# Patient Record
Sex: MALE | Race: WHITE | Employment: FULL TIME | URBAN - METROPOLITAN AREA
[De-identification: names, ages, dates, MRNs, and addresses within clinical notes are randomized per-mention and may not be internally consistent; named-entity substitution may affect disease eponyms.]

---

## 2017-11-07 ENCOUNTER — OFFICE VISIT (OUTPATIENT)
Dept: INTERNAL MEDICINE CLINIC | Age: 27
End: 2017-11-07

## 2017-11-07 VITALS
DIASTOLIC BLOOD PRESSURE: 70 MMHG | HEIGHT: 73 IN | HEART RATE: 65 BPM | BODY MASS INDEX: 25.18 KG/M2 | OXYGEN SATURATION: 95 % | SYSTOLIC BLOOD PRESSURE: 106 MMHG | WEIGHT: 190 LBS | TEMPERATURE: 97 F

## 2017-11-07 DIAGNOSIS — R22.1 NECK MASS: Primary | ICD-10-CM

## 2017-11-07 NOTE — MR AVS SNAPSHOT
Visit Information Date & Time Provider Department Dept. Phone Encounter #  
 11/7/2017  1:50 PM Lupis Callahan MD [facility] [id number] Follow-up Instructions Return if symptoms worsen or fail to improve. Upcoming Health Maintenance Date Due DTaP/Tdap/Td series (1 - Tdap) 3/3/2013 Influenza Age 5 to Adult 8/1/2017 Allergies as of 11/7/2017  Review Complete On: 11/7/2017 By: Lupis Callahan MD  
  
 Severity Noted Reaction Type Reactions Banana Medium 10/28/2015    Itching Current Immunizations  Never Reviewed No immunizations on file. Not reviewed this visit You Were Diagnosed With   
  
 Codes Comments Neck mass    -  Primary ICD-10-CM: R22.1 ICD-9-CM: 666. 2 Vitals BP Pulse Temp Height(growth percentile) Weight(growth percentile) SpO2  
 106/70 (BP 1 Location: Left arm, BP Patient Position: Sitting) 65 97 °F (36.1 °C) 6' 1\" (1.854 m) 190 lb (86.2 kg) 95% BMI Smoking Status 25.07 kg/m2 Never Smoker BMI and BSA Data Body Mass Index Body Surface Area 25.07 kg/m 2 2.11 m 2 Preferred Pharmacy Pharmacy Name Phone [facility] [address] Myles Boxer [phone] Your Updated Medication List  
  
Notice  As of 11/7/2017  2:19 PM  
 You have not been prescribed any medications. We Performed the Following REFERRAL TO GENERAL SURGERY [REF27 Custom] Comments: Anterior soft tissue neck mass Follow-up Instructions Return if symptoms worsen or fail to improve. Referral Information Referral ID Referred By Referred To  
  
 [id number] Misty Gomez MD   
   [address] [place] [address] [address] [place] [address] [facility] Phone: [phone] Fax: [phone] Visits Status Start Date End Date 1 New Request 11/7/17 11/7/18 If your referral has a status of pending review or denied, additional information will be sent to support the outcome of this decision. Introducing hospitals & HEALTH SERVICES! Ron Garcia introduces [employer] patient portal. Now you can access parts of your medical record, email your doctor's office, and request medication refills online. 1. In your internet browser, go to [email or web address] 2. Click on the First Time User? Click Here link in the Sign In box. You will see the New Member Sign Up page. 3. Enter your [employer] Access Code exactly as it appears below. You will not need to use this code after youve completed the sign-up process. If you do not sign up before the expiration date, you must request a new code. · [employer] Access Code: [email or web address] Expires: 2/5/2018  1:46 PM 
 
4. Enter the last four digits of your Social Security Number (xxxx) and Date of Birth (mm/dd/yyyy) as indicated and click Submit. You will be taken to the next sign-up page. 5. Create a [employer] ID. This will be your [employer] login ID and cannot be changed, so think of one that is secure and easy to remember. 6. Create a [employer] password. You can change your password at any time. 7. Enter your Password Reset Question and Answer. This can be used at a later time if you forget your password. 8. Enter your e-mail address. You will receive e-mail notification when new information is available in [address] 9. Click Sign Up. You can now view and download portions of your medical record. 10. Click the Download Summary menu link to download a portable copy of your medical information. If you have questions, please visit the Frequently Asked Questions section of the [employer] website. Remember, [employer] is NOT to be used for urgent needs. For medical emergencies, dial 911. Now available from your iPhone and Android! Please provide this summary of care documentation to your next provider. Your primary care clinician is listed as KINGS Bee. If you have any questions after today's visit, please call [phone].

## 2017-11-07 NOTE — PROGRESS NOTES
This note will not be viewable in [address] Subjective: Bert Hood presents the office today having found a neck mass. The mass is in the anterior portion of his neck approximately over the cricoid cartilage. It is nontender and it moves whenever he swallows. He has had no dysphasia. He denies any skin changes. Past Medical History:   Diagnosis Date    Headache      Past Surgical History:   Procedure Laterality Date    HX HEENT      tonsillectomy    HX TONSILLECTOMY       Allergies   Allergen Reactions    Banana Itching       Social History     Social History    Marital status: SINGLE     Spouse name: N/A    Number of children: N/A    Years of education: N/A     Social History Main Topics    Smoking status: Never Smoker    Smokeless tobacco: Never Used    Alcohol use 0.6 oz/week     1 Cans of beer per week    Drug use: No    Sexual activity: Not Asked     Other Topics Concern    None     Social History Narrative     Family History   Problem Relation Age of Onset    No Known Problems Mother     Cancer Father      renal    Diabetes Maternal Grandmother        Review of Systems:  GEN: no weight loss, weight gain, fatigue or night sweats  ENT: soft tissue neck mass found  CV: no PND, orthopnea, or palpitations  Resp: no dyspnea on exertion, no cough  Abd: no nausea, vomiting or diarrhea  EXT: denies edema, claudication  Endocrine: no hair loss, excessive thirst or polyuria  Neurological ROS: no TIA or stroke symptoms  ROS otherwise negative      Objective:     Visit Vitals    /70 (BP 1 Location: Left arm, BP Patient Position: Sitting)    Pulse 65    Temp 97 °F (36.1 °C)    Ht 6' 1\" (1.854 m)    Wt 190 lb (86.2 kg)    SpO2 95%    BMI 25.07 kg/m2     Body mass index is 25.07 kg/(m^2). General:   alert, cooperative and no distress   Neck  there is a soft tissue neck mass present over the cricoid cartilage which is freely movable, nontender, soft and rubbery.   No neck gland adenopathy is appreciated. No overlying skin changes are noted   Mouth:  No oral lesions, no pharyngeal erythema, no exudates   Neck: Trachea midline, no thyromegaly, no bruits   Heart: S1 and S2 normal,no murmurs noted    Lungs: Clear to auscultation bilaterally, no increased work of breathing   Abdomen: Soft, nontender. Normal bowel sounds   Extremities: No edema or cyanosis   Neuro: ..alert, oriented x3,speech normal in context and clarity, cranial nerves II-XII intact,motor strength: full proximally and distally,gait: normal  reflexes: full and symmetric     Physical exam otherwise negative         Assessment/Plan:     Diagnoses and all orders for this visit:    Neck mass  -     REFERRAL TO GENERAL SURGERY        Other instructions: The patient has a discrete soft tissue mass overlying the cricoid cartilage which is freely movable soft and rubbery and may just be a lipoma. We will arrange for a surgical consultation to have this looked at and evaluated for excision. Follow-up Disposition:  Return if symptoms worsen or fail to improve.     Feng Rene MD

## 2017-11-07 NOTE — PROGRESS NOTES
Sherri Reis is a 24 y.o. male presenting for Cyst (throat)  . 1. Have you been to the ER, urgent care clinic since your last visit? Hospitalized since your last visit? No    2. Have you seen or consulted any other health care providers outside of the [address] [place] [address] since your last visit? Include any pap smears or colon screening. No    No flowsheet data found. No flowsheet data found. PHQ over the last two weeks 11/7/2017   Little interest or pleasure in doing things Not at all   Feeling down, depressed or hopeless Not at all   Total Score PHQ 2 0       There are no discontinued medications.

## 2017-11-14 ENCOUNTER — OFFICE VISIT (OUTPATIENT)
Dept: SURGERY | Age: 27
End: 2017-11-14

## 2017-11-14 VITALS
BODY MASS INDEX: 25.18 KG/M2 | SYSTOLIC BLOOD PRESSURE: 105 MMHG | DIASTOLIC BLOOD PRESSURE: 69 MMHG | OXYGEN SATURATION: 99 % | HEIGHT: 73 IN | WEIGHT: 190 LBS | HEART RATE: 68 BPM | RESPIRATION RATE: 20 BRPM

## 2017-11-14 DIAGNOSIS — R22.1 NECK MASS: Primary | ICD-10-CM

## 2017-11-14 NOTE — MR AVS SNAPSHOT
Visit Information Date & Time Provider Department Dept. Phone Encounter #  
 11/14/2017  4:00 PM Tenny Galeazzi, MD [facility] [id number] Upcoming Health Maintenance Date Due DTaP/Tdap/Td series (1 - Tdap) 3/3/2013 Influenza Age 5 to Adult 8/1/2017 Allergies as of 11/14/2017  Review Complete On: 11/7/2017 By: Feng Rene MD  
  
 Severity Noted Reaction Type Reactions Banana Medium 10/28/2015    Itching Current Immunizations  Never Reviewed No immunizations on file. Not reviewed this visit Vitals BP Pulse Resp Height(growth percentile) Weight(growth percentile) SpO2  
 105/69 68 20 6' 1\" (1.854 m) 190 lb (86.2 kg) 99% BMI Smoking Status 25.07 kg/m2 Never Smoker BMI and BSA Data Body Mass Index Body Surface Area 25.07 kg/m 2 2.11 m 2 Preferred Pharmacy Pharmacy Name Phone [address] Gallegos, [address] [place] [address] [phone] Your Updated Medication List  
  
Notice  As of 11/14/2017  4:22 PM  
 You have not been prescribed any medications. Introducing [facility]! Sharita Nickerson introduces [employer] patient portal. Now you can access parts of your medical record, email your doctor's office, and request medication refills online. 1. In your internet browser, go to [email or web address] 2. Click on the First Time User? Click Here link in the Sign In box. You will see the New Member Sign Up page. 3. Enter your [employer] Access Code exactly as it appears below. You will not need to use this code after youve completed the sign-up process. If you do not sign up before the expiration date, you must request a new code. · [employer] Access Code: [email or web address] Expires: 2/5/2018  1:46 PM 
 
4.  Enter the last four digits of your Social Security Number (xxxx) and Date of Birth (mm/dd/yyyy) as indicated and click Submit. You will be taken to the next sign-up page. 5. Create a [employer] ID. This will be your [employer] login ID and cannot be changed, so think of one that is secure and easy to remember. 6. Create a [employer] password. You can change your password at any time. 7. Enter your Password Reset Question and Answer. This can be used at a later time if you forget your password. 8. Enter your e-mail address. You will receive e-mail notification when new information is available in [address] 9. Click Sign Up. You can now view and download portions of your medical record. 10. Click the Download Summary menu link to download a portable copy of your medical information. If you have questions, please visit the Frequently Asked Questions section of the [employer] website. Remember, [employer] is NOT to be used for urgent needs. For medical emergencies, dial 911. Now available from your iPhone and Android! Please provide this summary of care documentation to your next provider. Your primary care clinician is listed as KINGS Bee. If you have any questions after today's visit, please call [phone].

## 2017-11-15 ENCOUNTER — TELEPHONE (OUTPATIENT)
Dept: SURGERY | Age: 27
End: 2017-11-15

## 2017-11-15 NOTE — TELEPHONE ENCOUNTER
Called spoke to Romana Drew @ [facility] appointment made for 11/28 with Dr. Funmilayo Sunshine. All information given to patient.

## 2017-12-06 NOTE — PROGRESS NOTES
To: Malia Stout MD, Maria L Mcgowan MD  From: Kathleen Rojo MD    Thank you for sending Alicia Austin to see us. 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  Body mass index is 25.07 kg/(m^2). Plan:   Discussed with Dr. Cora Foster. He will see patient who will likely need excision in the OR. HPI:   Alicia Austin is a 24 y.o. male who is seen in consultation at the request of Malia Stout MD for evaluation of a tender nodule over his cisneros apple. It was first noticed a few weeks ago. It has been inflamed. It has been painful. There has not been drainage. Past Medical History:   Diagnosis Date    Headache      Past Surgical History:   Procedure Laterality Date    HX HEENT      tonsillectomy    HX TONSILLECTOMY        Family History   Problem Relation Age of Onset    No Known Problems Mother     Cancer Father      renal    Diabetes Maternal Grandmother      Social History   Substance Use Topics    Smoking status: Never Smoker    Smokeless tobacco: Never Used    Alcohol use 0.6 oz/week     1 Cans of beer per week      No current outpatient prescriptions on file. No current facility-administered medications for this visit. Allergies: Allergies   Allergen Reactions    Banana Itching       Review of Systems:  10 systems reviewed. See scanned sheet in \"Media\" section. See HPI for pertinent positives and negatives. Objective:     Visit Vitals    /69    Pulse 68    Resp 20    Ht 6' 1\" (1.854 m)    Wt 86.2 kg (190 lb)    SpO2 99%    BMI 25.07 kg/m2       Physical Exam:  General appearance  Alert, cooperative, no distress, appears stated age   [de-identified] Anicteric           Lungs   Clear to auscultation bilaterally   Heart  Regular rate and rhythm. No murmur, rub or gallop   Abdomen   Soft, non-tender.     Extremities no cyanosis or edema   Pulses 2+ right radial       Lymph nodes No palpable cervical LAD. Neurologic Without overt sensory or motor deficit     Focused exam of the neck reveals midline tender nodule over thyroid that elevates with swallowing. No erythema.       Signed By: Cayla Craft MD     December 6, 2017

## 2017-12-11 ENCOUNTER — HOSPITAL ENCOUNTER (OUTPATIENT)
Dept: CT IMAGING | Age: 27
Discharge: HOME | End: 2017-12-11
Attending: OTOLARYNGOLOGY
Payer: COMMERCIAL

## 2017-12-11 DIAGNOSIS — Q89.2 THYROGLOSSAL CYST: ICD-10-CM

## 2017-12-11 PROCEDURE — 74011250636 HC RX REV CODE- 250/636: Performed by: OTOLARYNGOLOGY

## 2017-12-11 PROCEDURE — 74011636320 HC RX REV CODE- 636/320: Performed by: OTOLARYNGOLOGY

## 2017-12-11 PROCEDURE — 70491 CT SOFT TISSUE NECK W/DYE: CPT

## 2017-12-11 RX ORDER — SODIUM CHLORIDE 0.9 % (FLUSH) 0.9 %
10 SYRINGE (ML) INJECTION
Status: COMPLETED | OUTPATIENT
Start: 2017-12-11 | End: 2017-12-11

## 2017-12-11 RX ORDER — SODIUM CHLORIDE 9 MG/ML
50 INJECTION, SOLUTION INTRAVENOUS
Status: COMPLETED | OUTPATIENT
Start: 2017-12-11 | End: 2017-12-11

## 2017-12-11 RX ADMIN — Medication 10 ML: at 08:12

## 2017-12-11 RX ADMIN — IOPAMIDOL 100 ML: 612 INJECTION, SOLUTION INTRAVENOUS at 08:12

## 2017-12-11 RX ADMIN — SODIUM CHLORIDE 50 ML/HR: 900 INJECTION, SOLUTION INTRAVENOUS at 08:12

## 2017-12-13 ENCOUNTER — APPOINTMENT (OUTPATIENT)
Dept: CT IMAGING | Age: 27
End: 2017-12-13
Attending: EMERGENCY MEDICINE
Payer: COMMERCIAL

## 2017-12-13 ENCOUNTER — HOSPITAL ENCOUNTER (EMERGENCY)
Age: 27
Discharge: HOME | End: 2017-12-13
Attending: EMERGENCY MEDICINE
Payer: COMMERCIAL

## 2017-12-13 VITALS
HEART RATE: 75 BPM | RESPIRATION RATE: 16 BRPM | SYSTOLIC BLOOD PRESSURE: 148 MMHG | WEIGHT: 189.6 LBS | DIASTOLIC BLOOD PRESSURE: 91 MMHG | OXYGEN SATURATION: 99 % | HEIGHT: 73 IN | TEMPERATURE: 97.5 F | BODY MASS INDEX: 25.13 KG/M2

## 2017-12-13 DIAGNOSIS — S09.90XA CLOSED HEAD INJURY, INITIAL ENCOUNTER: Primary | ICD-10-CM

## 2017-12-13 PROCEDURE — 70450 CT HEAD/BRAIN W/O DYE: CPT

## 2017-12-13 PROCEDURE — 99282 EMERGENCY DEPT VISIT SF MDM: CPT

## 2017-12-13 PROCEDURE — 74011250637 HC RX REV CODE- 250/637: Performed by: EMERGENCY MEDICINE

## 2017-12-13 RX ORDER — ONDANSETRON 8 MG/1
8 TABLET, ORALLY DISINTEGRATING ORAL
Qty: 12 TAB | Refills: 0 | Status: SHIPPED | OUTPATIENT
Start: 2017-12-13 | End: 2017-12-19 | Stop reason: ALTCHOICE

## 2017-12-13 RX ORDER — IBUPROFEN 600 MG/1
600 TABLET ORAL
Status: COMPLETED | OUTPATIENT
Start: 2017-12-13 | End: 2017-12-13

## 2017-12-13 RX ORDER — ACETAMINOPHEN 500 MG
1000 TABLET ORAL ONCE
Status: COMPLETED | OUTPATIENT
Start: 2017-12-13 | End: 2017-12-13

## 2017-12-13 RX ADMIN — ACETAMINOPHEN 1000 MG: 500 TABLET ORAL at 19:03

## 2017-12-13 RX ADMIN — IBUPROFEN 600 MG: 600 TABLET, FILM COATED ORAL at 19:03
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
## 2017-12-13 NOTE — LETTER
[address] 
[facility] EMERGENCY DEPT 
[address] [place] [address] [phone] 
[phone] Work/School Note Date: 12/13/2017 To Whom It May concern: 
 
Ran Bosch was seen and treated today in the emergency room by the following provider(s): 
Attending Provider: Charles Keane DO. Ran Bosch may return to work on 12/15/17. Sincerely, Charles Keane DO

## 2017-12-14 NOTE — DISCHARGE INSTRUCTIONS
Learning About a Closed Head Injury  What is a closed head injury? A closed head injury happens when your head gets hit hard. The strong force of the blow causes your brain to shake in your skull. This movement can cause the brain to bruise, swell, or tear. Sometimes nerves or blood vessels also get damaged. This can cause bleeding in or around the brain. A concussion is a type of closed head injury. What are the symptoms? If you have a mild concussion, you may have a mild headache or feel \"not quite right. \" These symptoms are common. They usually go away over a few days to 4 weeks. But sometimes after a concussion, you feel like you can't function as well as before the injury. And you have new symptoms. This is called postconcussive syndrome. You may:  · Find it harder to solve problems, think, concentrate, or remember. · Have headaches. · Have changes in your sleep patterns, such as not being able to sleep or sleeping all the time. · Have changes in your personality. · Not be interested in your usual activities. · Feel angry or anxious without a clear reason. · Lose your sense of taste or smell. · Be dizzy, lightheaded, or unsteady. It may be hard to stand or walk. How is a closed head injury treated? Any person who may have a concussion needs to see a doctor. Some people have to stay in the hospital to be watched. Others can go home safely. If you go home, follow your doctor's instructions. He or she will tell you if you need someone to watch you closely for the next 24 hours or longer. Rest is the best treatment. Get plenty of sleep at night. And try to rest during the day. · Avoid activities that are physically or mentally demanding. These include housework, exercise, and schoolwork. And don't play video games, send text messages, or use the computer. You may need to change your school or work schedule to be able to avoid these activities.   · Ask your doctor when it's okay to drive, ride a bike, or operate machinery. · Take an over-the-counter pain medicine, such as acetaminophen (Tylenol), ibuprofen (Advil, Motrin), or naproxen (Aleve). Be safe with medicines. Read and follow all instructions on the label. · Check with your doctor before you use any other medicines for pain. · Do not drink alcohol or use illegal drugs. They can slow recovery. They can also increase your risk of getting a second head injury. Follow-up care is a key part of your treatment and safety. Be sure to make and go to all appointments, and call your doctor if you are having problems. It's also a good idea to know your test results and keep a list of the medicines you take. Where can you learn more? Go to http://vanda-aleena.info/. Enter E235 in the search box to learn more about \"Learning About a Closed Head Injury. \"  Current as of: October 14, 2016  Content Version: 11.4  © 9158-5694 Healthwise, [employer]orporated. Care instructions adapted under license by [employer] (which disclaims liability or warranty for this information). If you have questions about a medical condition or this instruction, always ask your healthcare professional. [address] any warranty or liability for your use of this information.

## 2017-12-14 NOTE — ED NOTES
Bedside and Verbal shift change report given to Anna Lucero RN (oncoming nurse) by Martínez Zamora RN (offgoing nurse). Report included the following information SBAR, Kardex, ED Summary, MAR and Med Rec Status.

## 2017-12-19 ENCOUNTER — OFFICE VISIT (OUTPATIENT)
Dept: INTERNAL MEDICINE CLINIC | Age: 27
End: 2017-12-19

## 2017-12-19 VITALS
TEMPERATURE: 97.1 F | DIASTOLIC BLOOD PRESSURE: 78 MMHG | HEART RATE: 80 BPM | SYSTOLIC BLOOD PRESSURE: 122 MMHG | WEIGHT: 190 LBS | HEIGHT: 73 IN | RESPIRATION RATE: 16 BRPM | BODY MASS INDEX: 25.18 KG/M2 | OXYGEN SATURATION: 98 %
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
## 2017-12-19 DIAGNOSIS — S23.9XXS THORACIC BACK SPRAIN, SEQUELA: Primary | ICD-10-CM

## 2017-12-19 RX ORDER — IBUPROFEN 200 MG
200 CAPSULE ORAL
COMMUNITY
End: 2018-05-02
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
## 2018-01-31 NOTE — PERIOP NOTES
[facility]  Ambulatory Surgery Unit  Pre-operative Instructions    Surgery/Procedure Date  2/7/18            Tentative Arrival Time TBA      1. On the day of your surgery/procedure, please report to the Ambulatory Surgery Unit Registration Desk and sign in at your designated time. The Ambulatory Surgery Unit is located in [facility] on the [facility] side of the [facility] across from the [address] [place] [address]. Please have all of your health insurance cards and a photo ID. 2. You must have someone with you to drive you home, as you should not drive a car for 24 hours following anesthesia. Please make arrangements for a responsible adult friend or family member to stay with you for at least the first 24 hours after your surgery. 3. Do not have anything to eat or drink (including water, gum, mints, coffee, juice) after midnight   2/6/18. This may not apply to medications prescribed by your physician. (Please note below the special instructions with medications to take the morning of surgery, if applicable.)    4. We recommend you do not drink any alcoholic beverages for 24 hours before and after your surgery. 5. Contact your surgeons office for instructions on the following medications: non-steroidal anti-inflammatory drugs (i.e. Advil, Aleve), vitamins, and supplements. (Some surgeons will want you to stop these medications prior to surgery and others may allow you to take them)   **If you are currently taking Plavix, Coumadin, Aspirin and/or other blood-thinning agents, contact your surgeon for instructions. ** Your surgeon will partner with the physician prescribing these medications to determine if it is safe to stop or if you need to continue taking. Please do not stop taking these medications without instructions from your surgeon.     6. In an effort to help prevent surgical site infection, we ask that you shower with an anti-bacterial soap (i.e. Dial or Safeguard) for 3 days prior to and on the morning of surgery, using a fresh towel after each shower. (Please begin this process with fresh bed linens.) Do not apply any lotions, powders, or deodorants after the shower on the day of your procedure. If applicable, please do not shave the operative site for 48 hours prior to surgery. 7. Wear comfortable clothes. Wear glasses instead of contacts. Do not bring any jewelry or money (other than copays or fees as instructed). Do not wear make-up, particularly mascara, the morning of your surgery. Do not wear nail polish, particularly if you are having foot /hand surgery. Wear your hair loose or down, no ponytails, buns, ramona pins or clips. All body piercings must be removed. 8. You should understand that if you do not follow these instructions your surgery may be cancelled. If your physical condition changes (i.e. fever, cold or flu) please contact your surgeon as soon as possible. 9. It is important that you be on time. If a situation occurs where you may be late, or if you have any questions or problems, please call [phone].    10. Your surgery time may be subject to change. You will receive a phone call the day prior to surgery to confirm your arrival time. 11. Pediatric patients: please bring a change of clothes, diapers, bottle/sippy cup, pacifier, etc.      Special Instructions: Take all medications and inhalers, as prescribed, on the morning of surgery with a sip of water EXCEPT: none      I understand a pre-operative phone call will be made to verify my surgery time. In the event that I am not available, I give permission for a message to be left on my answering service and/or with another person?       Yes     (instructions given verbally during phone assessment- pt voiced understanding)     ___________________      ___________________      ________________  (Signature of Patient)          (Witness)                   (Date and Time)

## 2018-02-06 ENCOUNTER — ANESTHESIA EVENT (OUTPATIENT)
Dept: SURGERY | Age: 28
End: 2018-02-06
Payer: COMMERCIAL

## 2018-02-07 ENCOUNTER — HOSPITAL ENCOUNTER (OUTPATIENT)
Age: 28
Setting detail: OUTPATIENT SURGERY
Discharge: HOME | End: 2018-02-07
Attending: OTOLARYNGOLOGY | Admitting: OTOLARYNGOLOGY
Payer: COMMERCIAL

## 2018-02-07 ENCOUNTER — ANESTHESIA (OUTPATIENT)
Dept: SURGERY | Age: 28
End: 2018-02-07
Payer: COMMERCIAL

## 2018-02-07 VITALS
OXYGEN SATURATION: 96 % | SYSTOLIC BLOOD PRESSURE: 106 MMHG | WEIGHT: 187.38 LBS | TEMPERATURE: 97.9 F | HEIGHT: 73 IN | HEART RATE: 51 BPM | BODY MASS INDEX: 24.83 KG/M2 | RESPIRATION RATE: 10 BRPM | DIASTOLIC BLOOD PRESSURE: 53 MMHG

## 2018-02-07 DIAGNOSIS — Q89.2 THYROGLOSSAL DUCT CYST: Primary | ICD-10-CM

## 2018-02-07 PROCEDURE — 77030026438 HC STYL ET INTUB CARD -A: Performed by: NURSE ANESTHETIST, CERTIFIED REGISTERED

## 2018-02-07 PROCEDURE — 77030031139 HC SUT VCRL2 J&J -A: Performed by: OTOLARYNGOLOGY

## 2018-02-07 PROCEDURE — 74011250636 HC RX REV CODE- 250/636

## 2018-02-07 PROCEDURE — 74011000250 HC RX REV CODE- 250: Performed by: OTOLARYNGOLOGY

## 2018-02-07 PROCEDURE — 74011250636 HC RX REV CODE- 250/636: Performed by: ANESTHESIOLOGY

## 2018-02-07 PROCEDURE — 76210000034 HC AMBSU PH I REC 0.5 TO 1 HR: Performed by: OTOLARYNGOLOGY

## 2018-02-07 PROCEDURE — 77030013079 HC BLNKT BAIR HGGR 3M -A: Performed by: NURSE ANESTHETIST, CERTIFIED REGISTERED

## 2018-02-07 PROCEDURE — 74011000250 HC RX REV CODE- 250

## 2018-02-07 PROCEDURE — 77030002933 HC SUT MCRYL J&J -A: Performed by: OTOLARYNGOLOGY

## 2018-02-07 PROCEDURE — 76210000050 HC AMBSU PH II REC 0.5 TO 1 HR: Performed by: OTOLARYNGOLOGY

## 2018-02-07 PROCEDURE — 77030021352 HC CBL LD SYS DISP COVD -B: Performed by: OTOLARYNGOLOGY

## 2018-02-07 PROCEDURE — 77030011640 HC PAD GRND REM COVD -A: Performed by: OTOLARYNGOLOGY

## 2018-02-07 PROCEDURE — 88305 TISSUE EXAM BY PATHOLOGIST: CPT | Performed by: OTOLARYNGOLOGY

## 2018-02-07 PROCEDURE — 77030013567 HC DRN WND RESERV BARD -A: Performed by: OTOLARYNGOLOGY

## 2018-02-07 PROCEDURE — 77030010938 HC CLP LIG TELE -A: Performed by: OTOLARYNGOLOGY

## 2018-02-07 PROCEDURE — 77030020255 HC SOL INJ LR 1000ML BG: Performed by: OTOLARYNGOLOGY

## 2018-02-07 PROCEDURE — 77030018836 HC SOL IRR NACL ICUM -A: Performed by: OTOLARYNGOLOGY

## 2018-02-07 PROCEDURE — 77030012407 HC DRN WND BARD -B: Performed by: OTOLARYNGOLOGY

## 2018-02-07 PROCEDURE — 77030002996 HC SUT SLK J&J -A: Performed by: OTOLARYNGOLOGY

## 2018-02-07 PROCEDURE — 77030011267 HC ELECTRD BLD COVD -A: Performed by: OTOLARYNGOLOGY

## 2018-02-07 PROCEDURE — 77030010516 HC APPL HEMA CLP TELE -B: Performed by: OTOLARYNGOLOGY

## 2018-02-07 PROCEDURE — 77030027744 HC PWDR HEMSTAT ARISTA ABSRB 5GM BARD -D: Performed by: OTOLARYNGOLOGY

## 2018-02-07 PROCEDURE — 76030000003 HC AMB SURG OR TIME 1.5 TO 2: Performed by: OTOLARYNGOLOGY

## 2018-02-07 PROCEDURE — 77030002916 HC SUT ETHLN J&J -A: Performed by: OTOLARYNGOLOGY

## 2018-02-07 PROCEDURE — 77030008684 HC TU ET CUF COVD -B: Performed by: NURSE ANESTHETIST, CERTIFIED REGISTERED

## 2018-02-07 PROCEDURE — 77030008467 HC STPLR SKN COVD -B: Performed by: OTOLARYNGOLOGY

## 2018-02-07 PROCEDURE — 76060000063 HC AMB SURG ANES 1.5 TO 2 HR: Performed by: OTOLARYNGOLOGY

## 2018-02-07 PROCEDURE — 74011250636 HC RX REV CODE- 250/636: Performed by: OTOLARYNGOLOGY

## 2018-02-07 RX ORDER — CEPHALEXIN 500 MG/1
500 CAPSULE ORAL 4 TIMES DAILY
Qty: 12 CAP | Refills: 0 | Status: SHIPPED | OUTPATIENT
Start: 2018-02-07 | End: 2018-02-10

## 2018-02-07 RX ORDER — LIDOCAINE HYDROCHLORIDE 10 MG/ML
0.1 INJECTION, SOLUTION EPIDURAL; INFILTRATION; INTRACAUDAL; PERINEURAL AS NEEDED
Status: DISCONTINUED | OUTPATIENT
Start: 2018-02-07 | End: 2018-02-07 | Stop reason: HOSPADM

## 2018-02-07 RX ORDER — SODIUM CHLORIDE, SODIUM LACTATE, POTASSIUM CHLORIDE, CALCIUM CHLORIDE 600; 310; 30; 20 MG/100ML; MG/100ML; MG/100ML; MG/100ML
25 INJECTION, SOLUTION INTRAVENOUS CONTINUOUS
Status: DISCONTINUED | OUTPATIENT
Start: 2018-02-07 | End: 2018-02-07 | Stop reason: HOSPADM

## 2018-02-07 RX ORDER — ROCURONIUM BROMIDE 10 MG/ML
INJECTION, SOLUTION INTRAVENOUS AS NEEDED
Status: DISCONTINUED | OUTPATIENT
Start: 2018-02-07 | End: 2018-02-07 | Stop reason: HOSPADM

## 2018-02-07 RX ORDER — ONDANSETRON 2 MG/ML
4 INJECTION INTRAMUSCULAR; INTRAVENOUS
Status: DISCONTINUED | OUTPATIENT
Start: 2018-02-07 | End: 2018-02-07 | Stop reason: HOSPADM

## 2018-02-07 RX ORDER — DIPHENHYDRAMINE HYDROCHLORIDE 50 MG/ML
12.5 INJECTION, SOLUTION INTRAMUSCULAR; INTRAVENOUS AS NEEDED
Status: DISCONTINUED | OUTPATIENT
Start: 2018-02-07 | End: 2018-02-07 | Stop reason: HOSPADM

## 2018-02-07 RX ORDER — LIDOCAINE HYDROCHLORIDE AND EPINEPHRINE 10; 10 MG/ML; UG/ML
1.5 INJECTION, SOLUTION INFILTRATION; PERINEURAL ONCE
Status: DISCONTINUED | OUTPATIENT
Start: 2018-02-07 | End: 2018-02-07 | Stop reason: HOSPADM

## 2018-02-07 RX ORDER — SODIUM CHLORIDE 0.9 % (FLUSH) 0.9 %
5-10 SYRINGE (ML) INJECTION AS NEEDED
Status: DISCONTINUED | OUTPATIENT
Start: 2018-02-07 | End: 2018-02-07 | Stop reason: HOSPADM

## 2018-02-07 RX ORDER — EPHEDRINE SULFATE 50 MG/ML
INJECTION, SOLUTION INTRAVENOUS AS NEEDED
Status: DISCONTINUED | OUTPATIENT
Start: 2018-02-07 | End: 2018-02-07 | Stop reason: HOSPADM

## 2018-02-07 RX ORDER — HYDROMORPHONE HYDROCHLORIDE 2 MG/ML
INJECTION, SOLUTION INTRAMUSCULAR; INTRAVENOUS; SUBCUTANEOUS AS NEEDED
Status: DISCONTINUED | OUTPATIENT
Start: 2018-02-07 | End: 2018-02-07 | Stop reason: HOSPADM

## 2018-02-07 RX ORDER — CEFAZOLIN SODIUM/WATER 2 G/20 ML
2 SYRINGE (ML) INTRAVENOUS EVERY 8 HOURS
Status: COMPLETED | OUTPATIENT
Start: 2018-02-07 | End: 2018-02-07

## 2018-02-07 RX ORDER — HYDROCODONE BITARTRATE AND ACETAMINOPHEN 5; 325 MG/1; MG/1
1-2 TABLET ORAL
Qty: 40 TAB | Refills: 0 | Status: SHIPPED | OUTPATIENT
Start: 2018-02-07 | End: 2018-05-02

## 2018-02-07 RX ORDER — BACITRACIN ZINC 500 UNIT/G
OINTMENT (GRAM) TOPICAL AS NEEDED
Status: DISCONTINUED | OUTPATIENT
Start: 2018-02-07 | End: 2018-02-07 | Stop reason: HOSPADM

## 2018-02-07 RX ORDER — CEFAZOLIN SODIUM/WATER 2 G/20 ML
SYRINGE (ML) INTRAVENOUS
Status: COMPLETED
Start: 2018-02-07 | End: 2018-02-07

## 2018-02-07 RX ORDER — OXYCODONE AND ACETAMINOPHEN 5; 325 MG/1; MG/1
1 TABLET ORAL
Status: DISCONTINUED | OUTPATIENT
Start: 2018-02-07 | End: 2018-02-07 | Stop reason: HOSPADM

## 2018-02-07 RX ORDER — FENTANYL CITRATE 50 UG/ML
INJECTION, SOLUTION INTRAMUSCULAR; INTRAVENOUS AS NEEDED
Status: DISCONTINUED | OUTPATIENT
Start: 2018-02-07 | End: 2018-02-07 | Stop reason: HOSPADM

## 2018-02-07 RX ORDER — ACETAMINOPHEN 10 MG/ML
INJECTION, SOLUTION INTRAVENOUS AS NEEDED
Status: DISCONTINUED | OUTPATIENT
Start: 2018-02-07 | End: 2018-02-07 | Stop reason: HOSPADM

## 2018-02-07 RX ORDER — ONDANSETRON 4 MG/1
4 TABLET, ORALLY DISINTEGRATING ORAL
Qty: 10 TAB | Refills: 2 | Status: SHIPPED | OUTPATIENT
Start: 2018-02-07 | End: 2018-05-02

## 2018-02-07 RX ORDER — NEOSTIGMINE METHYLSULFATE 1 MG/ML
INJECTION INTRAVENOUS AS NEEDED
Status: DISCONTINUED | OUTPATIENT
Start: 2018-02-07 | End: 2018-02-07 | Stop reason: HOSPADM

## 2018-02-07 RX ORDER — SUCCINYLCHOLINE CHLORIDE 20 MG/ML
INJECTION INTRAMUSCULAR; INTRAVENOUS AS NEEDED
Status: DISCONTINUED | OUTPATIENT
Start: 2018-02-07 | End: 2018-02-07 | Stop reason: HOSPADM

## 2018-02-07 RX ORDER — DIPHENHYDRAMINE HYDROCHLORIDE 50 MG/ML
25 INJECTION, SOLUTION INTRAMUSCULAR; INTRAVENOUS
Status: DISCONTINUED | OUTPATIENT
Start: 2018-02-07 | End: 2018-02-07 | Stop reason: HOSPADM

## 2018-02-07 RX ORDER — LIDOCAINE HYDROCHLORIDE 20 MG/ML
INJECTION, SOLUTION EPIDURAL; INFILTRATION; INTRACAUDAL; PERINEURAL AS NEEDED
Status: DISCONTINUED | OUTPATIENT
Start: 2018-02-07 | End: 2018-02-07 | Stop reason: HOSPADM

## 2018-02-07 RX ORDER — FENTANYL CITRATE 50 UG/ML
25 INJECTION, SOLUTION INTRAMUSCULAR; INTRAVENOUS
Status: DISCONTINUED | OUTPATIENT
Start: 2018-02-07 | End: 2018-02-07 | Stop reason: HOSPADM

## 2018-02-07 RX ORDER — MIDAZOLAM HYDROCHLORIDE 1 MG/ML
INJECTION, SOLUTION INTRAMUSCULAR; INTRAVENOUS AS NEEDED
Status: DISCONTINUED | OUTPATIENT
Start: 2018-02-07 | End: 2018-02-07 | Stop reason: HOSPADM

## 2018-02-07 RX ORDER — GLYCOPYRROLATE 0.2 MG/ML
INJECTION INTRAMUSCULAR; INTRAVENOUS AS NEEDED
Status: DISCONTINUED | OUTPATIENT
Start: 2018-02-07 | End: 2018-02-07 | Stop reason: HOSPADM

## 2018-02-07 RX ORDER — MORPHINE SULFATE 10 MG/ML
2 INJECTION, SOLUTION INTRAMUSCULAR; INTRAVENOUS
Status: DISCONTINUED | OUTPATIENT
Start: 2018-02-07 | End: 2018-02-07 | Stop reason: HOSPADM

## 2018-02-07 RX ORDER — HYDROCODONE BITARTRATE AND ACETAMINOPHEN 7.5; 325 MG/1; MG/1
1-2 TABLET ORAL
Status: DISCONTINUED | OUTPATIENT
Start: 2018-02-07 | End: 2018-02-07 | Stop reason: HOSPADM

## 2018-02-07 RX ORDER — SODIUM CHLORIDE 0.9 % (FLUSH) 0.9 %
5-10 SYRINGE (ML) INJECTION EVERY 8 HOURS
Status: DISCONTINUED | OUTPATIENT
Start: 2018-02-07 | End: 2018-02-07 | Stop reason: HOSPADM

## 2018-02-07 RX ORDER — DEXAMETHASONE SODIUM PHOSPHATE 4 MG/ML
INJECTION, SOLUTION INTRA-ARTICULAR; INTRALESIONAL; INTRAMUSCULAR; INTRAVENOUS; SOFT TISSUE AS NEEDED
Status: DISCONTINUED | OUTPATIENT
Start: 2018-02-07 | End: 2018-02-07 | Stop reason: HOSPADM

## 2018-02-07 RX ORDER — PROPOFOL 10 MG/ML
INJECTION, EMULSION INTRAVENOUS AS NEEDED
Status: DISCONTINUED | OUTPATIENT
Start: 2018-02-07 | End: 2018-02-07 | Stop reason: HOSPADM

## 2018-02-07 RX ORDER — HYDROMORPHONE HYDROCHLORIDE 1 MG/ML
.2-.5 INJECTION, SOLUTION INTRAMUSCULAR; INTRAVENOUS; SUBCUTANEOUS ONCE
Status: DISCONTINUED | OUTPATIENT
Start: 2018-02-07 | End: 2018-02-07 | Stop reason: HOSPADM

## 2018-02-07 RX ORDER — ONDANSETRON 2 MG/ML
INJECTION INTRAMUSCULAR; INTRAVENOUS AS NEEDED
Status: DISCONTINUED | OUTPATIENT
Start: 2018-02-07 | End: 2018-02-07 | Stop reason: HOSPADM

## 2018-02-07 RX ADMIN — PROPOFOL 180 MG: 10 INJECTION, EMULSION INTRAVENOUS at 09:08

## 2018-02-07 RX ADMIN — DEXAMETHASONE SODIUM PHOSPHATE 10 MG: 4 INJECTION, SOLUTION INTRA-ARTICULAR; INTRALESIONAL; INTRAMUSCULAR; INTRAVENOUS; SOFT TISSUE at 09:23

## 2018-02-07 RX ADMIN — EPHEDRINE SULFATE 10 MG: 50 INJECTION, SOLUTION INTRAVENOUS at 09:38

## 2018-02-07 RX ADMIN — Medication 2 G: at 09:19

## 2018-02-07 RX ADMIN — ONDANSETRON 4 MG: 2 INJECTION INTRAMUSCULAR; INTRAVENOUS at 10:57

## 2018-02-07 RX ADMIN — ACETAMINOPHEN 1000 MG: 10 INJECTION, SOLUTION INTRAVENOUS at 10:44

## 2018-02-07 RX ADMIN — SODIUM CHLORIDE, SODIUM LACTATE, POTASSIUM CHLORIDE, AND CALCIUM CHLORIDE 25 ML/HR: 600; 310; 30; 20 INJECTION, SOLUTION INTRAVENOUS at 08:09

## 2018-02-07 RX ADMIN — NEOSTIGMINE METHYLSULFATE 3 MG: 1 INJECTION INTRAVENOUS at 10:55

## 2018-02-07 RX ADMIN — ROCURONIUM BROMIDE 30 MG: 10 INJECTION, SOLUTION INTRAVENOUS at 09:15

## 2018-02-07 RX ADMIN — FENTANYL CITRATE 50 MCG: 50 INJECTION, SOLUTION INTRAMUSCULAR; INTRAVENOUS at 09:16

## 2018-02-07 RX ADMIN — SUCCINYLCHOLINE CHLORIDE 180 MG: 20 INJECTION INTRAMUSCULAR; INTRAVENOUS at 09:08

## 2018-02-07 RX ADMIN — FENTANYL CITRATE 50 MCG: 50 INJECTION, SOLUTION INTRAMUSCULAR; INTRAVENOUS at 09:08

## 2018-02-07 RX ADMIN — GLYCOPYRROLATE 0.5 MG: 0.2 INJECTION INTRAMUSCULAR; INTRAVENOUS at 10:54

## 2018-02-07 RX ADMIN — MIDAZOLAM HYDROCHLORIDE 2 MG: 1 INJECTION, SOLUTION INTRAMUSCULAR; INTRAVENOUS at 09:03

## 2018-02-07 RX ADMIN — HYDROMORPHONE HYDROCHLORIDE 0.4 MG: 2 INJECTION, SOLUTION INTRAMUSCULAR; INTRAVENOUS; SUBCUTANEOUS at 10:59

## 2018-02-07 RX ADMIN — LIDOCAINE HYDROCHLORIDE 80 MG: 20 INJECTION, SOLUTION EPIDURAL; INFILTRATION; INTRACAUDAL; PERINEURAL at 09:08

## 2018-02-07 RX ADMIN — SODIUM CHLORIDE, SODIUM LACTATE, POTASSIUM CHLORIDE, AND CALCIUM CHLORIDE: 600; 310; 30; 20 INJECTION, SOLUTION INTRAVENOUS at 10:57

## 2018-02-07 NOTE — IP AVS SNAPSHOT
[address] [facility] 
[phone] Patient: George Joy MRN: [id number] UOR:[birth year] About your hospitalization You were admitted on:  February 7, 2018 You last received care in the:  [facility] ASU PACU You were discharged on:  February 7, 2018 Why you were hospitalized Your primary diagnosis was:  Not on File Follow-up Information Follow up With Details Comments Contact Info MD Peggy Lentz [address] [facility] 
[phone] Discharge Orders None A check sergio indicates which time of day the medication should be taken. My Medications START taking these medications Instructions Each Dose to Equal  
 Morning Noon Evening Bedtime  
 cephALEXin 500 mg capsule Commonly known as:  Jazmin Rush Your last dose was: Your next dose is: Take 1 Cap by mouth four (4) times daily for 3 days. 500 mg HYDROcodone-acetaminophen 5-325 mg per tablet Commonly known as:  Renetta Fling Your last dose was: Your next dose is: Take 1-2 Tabs by mouth every six (6) hours as needed for Pain. Max Daily Amount: 8 Tabs. 1-2 Tab  
    
   
   
   
  
 ondansetron 4 mg disintegrating tablet Commonly known as:  ZOFRAN ODT Your last dose was: Your next dose is: Take 1 Tab by mouth every eight (8) hours as needed for Nausea. 4 mg CONTINUE taking these medications Instructions Each Dose to Equal  
 Morning Noon Evening Bedtime  
 ibuprofen 200 mg Cap Your last dose was: Your next dose is: Take 200 mg by mouth daily as needed. 200 mg Where to Get Your Medications Information on where to get these meds will be given to you by the nurse or doctor. ! Ask your nurse or doctor about these medications  
  cephALEXin 500 mg capsule HYDROcodone-acetaminophen 5-325 mg per tablet  
 ondansetron 4 mg disintegrating tablet Discharge Instructions [address], [address] [facility] Operative Instructions 1. DIET Start a soft diet and progress to usual diet as tolerated, unless otherwise directed. It is important to remember that good overall diet and health promotes healing. 2.  ACTIVITY No heavy exertion or heavy lifting for 10 days. Light activities are permitted but, avoid any stretching or bending of the neck for 10 days. 3.  WOUND CARE A. Apply antibiotic ointment twice daily to suture or staple sites and continue for 2-3 days following removal. 
B. May shower or bathe following surgery but make sure to apply antibiotic ointment prior to doing so and do not submerge the area in water. C. If you have a paper tape dressing, make every attempt to keep it dry until it is removed. 4.  THINGS TO BE CONCERNED ABOUT Please call the office for any of these changes A. Increasing pain B. New red discoloration C. New drainage of any description D. Increasing warmth of wound 5. DRAIN MANAGEMENT A. Follow nursing instructions B. Generally, your doctor will plan to have drains removed between 1 and 3 days. 6. LONG-TERM WOUND HEALING 
A. Expect the wound to have a slight ridge for up to 6 weeks. This is usually by design for optimal wound healing. B. Local numbness is usual around wound sites, and can last up to and beyond 3 months. C. To minimize the prolonged redness or pigmentation around a wound, we recommend use of sunscreen for 3 months or longer on healed wounds. If you have any questions or concerns following your surgery, do not hesitate to contact our office at Office Phone:  [phone] Jason Fuentes office hours are 8:00 a.m. to 4:30 p.m.  You should be able to reach us after hours by calling the regular office number. If for some reason you are not able to reach our [address] [place] [address] service through this main number you may call them directly at [phone]. DO NOT TAKE TYLENOL/ACETAMINOPHEN WITH PERCOCET, LORTAB, [address] TAKE NARCOTIC PAIN MEDICATIONS WITH FOOD If given 2 pain narcotics do NOT take together! Narcotics tend to be constipating, we suggest taking a stool softener such as Colace or Miralax (follow package instructions). DO NOT DRIVE WHILE TAKING NARCOTIC PAIN MEDICATIONS. DO NOT TAKE SLEEPING MEDICATIONS OR ANTIANXIETY MEDICATIONS WHILE TAKING NARCOTIC PAIN MEDICATIONS,  ESPECIALLY THE NIGHT OF ANESTHESIA. CPAP PATIENTS BE SURE TO WEAR MACHINE WHENEVER NAPPING OR SLEEPING. DISCHARGE SUMMARY from Nurse The following personal items collected during your admission are returned to you:  
Dental Appliance: Dental Appliances: None Vision: Visual Aid: None Hearing Aid:   
Jewelry: Jewelry: None Clothing: Clothing: Other (comment) (belonging bag) Other Valuables: Other Valuables: Cell Phone (given to mom) Valuables sent to safe:   
 
 
PATIENT INSTRUCTIONS: 
 
 
B - Balance E - Eyes F-  Face looks uneven A-  Arms unable to move or move even S-  Speech slurred or non-existent T-  Time-call 911 as soon as signs and symptoms begin-DO NOT go Back to bed or wait to see if you get better-TIME IS BRAIN. If you have not received your influenza and/or pneumococcal vaccine, please follow up with your primary care physician. The discharge information has been reviewed with the patient and caregiver. The patient and caregiver verbalized understanding. Introducing [facility]! [employer] introduces [employer] patient portal. Now you can access parts of your medical record, email your doctor's office, and request medication refills online. 1. In your internet browser, go to [email or web address] [email or web address] 2. Click on the First Time User? Click Here link in the Sign In box. You will see the New Member Sign Up page. 3. Enter your [employer] Access Code exactly as it appears below. You will not need to use this code after youve completed the sign-up process. If you do not sign up before the expiration date, you must request a new code. · [employer] Access Code: [email or web address] Expires: 5/7/2018  6:20 AM 
 
 4. Enter the last four digits of your Social Security Number (xxxx) and Date of Birth (mm/dd/yyyy) as indicated and click Submit. You will be taken to the next sign-up page. 5. Create a [employer] ID. This will be your [employer] login ID and cannot be changed, so think of one that is secure and easy to remember. 6. Create a [employer] password. You can change your password at any time. 7. Enter your Password Reset Question and Answer. This can be used at a later time if you forget your password. 8. Enter your e-mail address. You will receive e-mail notification when new information is available in [address] 9. Click Sign Up. You can now view and download portions of your medical record. 10. Click the Download Summary menu link to download a portable copy of your medical information. If you have questions, please visit the Frequently Asked Questions section of the [employer] website. Remember, [employer] is NOT to be used for urgent needs. For medical emergencies, dial 911. Now available from your iPhone and Android! Providers Seen During Your Hospitalization Provider Specialty Primary office phone Reese Gary MD Otolaryngology [phone] Your Primary Care Physician (PCP) Primary Care Physician Office Phone Office Fax Cat Parker [phone] You are allergic to the following Allergen Reactions Banana Itching Recent Documentation Height Weight BMI Smoking Status 1.854 m 85 kg 24.72 kg/m2 Never Smoker Emergency Contacts Name Discharge Info Relation Home Work Mobile [facility] DISCHARGE CAREGIVER [3] Mother [14] [phone] Patient Belongings  The following personal items are in your possession at time of discharge: 
  Dental Appliances: None  Visual Aid: None      Home Medications: None   Jewelry: None  Clothing: Other (comment) (belonging bag)    Other Valuables: Cell Phone (given to mom) Discharge Instructions Attachments/References SURGICAL DRAIN CARE (ENGLISH) MEFS - HYDROCODONE/ACETAMINOPHEN (VICODIN, Heidy Raúl, LORTAB) - (BY MOUTH) (ENGLISH) MEFS - ONDANSETRON (ZOFRAN, ZOFRAN ODT, ZUPLENZ) - (BY MOUTH, INTO THE MOUTH) (ENGLISH) MEFS - CEPHALEXIN (BIO-CEF, KEFLEX) - (BY MOUTH) (ENGLISH) Patient Handouts Surgical Drain Care: Care Instructions What is a surgical drain? After a surgery, fluid may collect inside your body in the surgical area. This makes an infection or other problems more likely. A surgical drain allows the fluid to flow out. The doctor will put a thin rubber tube into the area of your body where the fluid is likely to collect. The rubber tube will carry the fluid outside your body. The most common type of surgical drain carries the fluid into a collection bulb that you empty. This is called a Fracisco-Leal drain. The drain uses suction created by the bulb to pull the fluid from your body into the bulb. The rubber tube will probably be held in place by one or two stitches in your skin. Most people attach the bulb with a safety pin to clothing or near the bandage so that it doesn't flip around or pull on the stitches. When you first get the drain, the fluid will be bloody. It will change color from red to pink to a light yellow or clear as the wound heals and the fluid starts to go away. Your doctor may give you specific information on when you no longer need the drain and when it will be removed. In general, you will need the drain until you are collecting less than about 2 tablespoons of fluid in 24 hours. Follow-up care is a key part of your treatment and safety. Be sure to make and go to all appointments, and call your doctor if you are having problems. It's also a good idea to know your test results and keep a list of the medicines you take. How can you care for yourself at home? Fluid collection Follow any instructions your doctor gives you. How often you empty the bulb depends on how much fluid is draining. Empty the bulb when it is half full. To empty the bulb: 
· Wash your hands with soap and water. · Take the plug out of the bulb. · Empty the bulb. If your doctor asks you to measure the fluid, empty the fluid into a measuring cup, and write down the color and how much you collected. Your doctor will want to know this information. How often you empty the bulb depends on how much fluid there is. Doctors often suggest emptying it when it's about half full. · Clean the plug with alcohol. · Squeeze the bulb until it is flat. This removes all the air from the bulb. You may need to put the bulb on a table or a counter to flatten it. · Keep the bulb flat and put the plug in. · The bulb should stay flat after you put the plug back in. This creates the suction that pulls the fluid into the bulb. · Empty the fluid into the toilet. · Wash your hands. Bandage care You may have a bandage. Your doctor will tell you how often to change it. · Wash your hands with soap and water. · Take off the bandage from around the drain. · Clean the drain site and the skin around it with soap and water. Use gauze or a cotton swab. · When the site is dry, put on a new bandage. Drain care Squeezing or \"milking\" the tube can help prevent clogs so that it drains correctly. Your doctor will tell you when you need to do this. In general, you do this when: 
· You see a clot in the tube that is preventing fluid from draining. The clot may look like a dark, stringy lining. · You see fluid leaking around the tube where it goes into the skin. · You think there is no suction in the drain. To milk the tube: · Use one hand to hold and pinch the tube where it leaves the skin. · With the other hand, pinch the tube with your thumb and first finger just below where you're holding it. · Slowly and firmly push your thumb and first finger down the tubing toward the bulb. · Do this as many times as you need to. The clot should move down the tube and into the bulb. When should you call for help? Call your doctor now or seek immediate medical care if: 
? · You have signs of infection, such as: 
¨ Increased pain, swelling, warmth, or redness around the area. ¨ Red streaks leading from the area. ¨ Pus draining from the area. ¨ A fever. ? · You see a sudden change in the color or smell of the drainage. ? · The tube is coming loose where it leaves your skin. ? Watch closely for changes in your health, and be sure to contact your doctor if: 
? · You see a lot of fluid around the drain. ? · You cannot remove a clot from the tube by milking the tube. Where can you learn more? Go to http://vanda-aleena.info/. Enter K117 in the search box to learn more about \"Surgical Drain Care: Care Instructions. \" Current as of: March 20, 2017 Content Version: 11.4 © 4819-4411 [employer]. Care instructions adapted under license by [employer] (which disclaims liability or warranty for this information). If you have questions about a medical condition or this instruction, always ask your healthcare professional. [address] any warranty or liability for your use of this information. Hydrocodone/Acetaminophen (Vicodin, Norco, Lortab) - (By mouth) Why this medicine is used:  
Treats pain. Contact a nurse or doctor right away if you have: · Blistering, peeling, red skin rash · Fast or slow heartbeat, shallow breathing, blue lips, fingernails, or skin · Anxiety, restlessness, muscle spasms, twitching, seeing or hearing things that are not there · Dark urine or pale stools, yellow skin or eyes · Extreme weakness, sweating, seizures, cold or clammy skin · Lightheadedness, dizziness, fainting, fever, sweating Common side effects: 
· Constipation, nausea, vomiting, loss of appetite, stomach pain · Tiredness or sleepiness © 2017 [facility] Information is for End User's use only and may not be sold, redistributed or otherwise used for commercial purposes. Ondansetron (Zofran, Zofran ODT, Zuplenz) - (By mouth, Into the mouth) Why this medicine is used:  
Prevents nausea and vomiting. Contact a nurse or doctor right away if you have: 
· Fast, pounding, or uneven heartbeat · Lightheadedness or fainting · Trouble breathing Common side effects: 
· Headache, tiredness · Constipation, diarrhea © 2017 [facility] Information is for End User's use only and may not be sold, redistributed or otherwise used for commercial purposes. Cephalexin (Bio-Cef, Keflex) - (By mouth) Why this medicine is used:  
Treats infections. Contact a nurse or doctor right away if you have: · Blistering, peeling, or red skin rash · Severe or bloody diarrhea Common side effects: · Mild diarrhea or nausea © 2017 [facility] Information is for End User's use only and may not be sold, redistributed or otherwise used for commercial purposes. Please provide this summary of care documentation to your next provider. Signatures-by signing, you are acknowledging that this After Visit Summary has been reviewed with you and you have received a copy. Patient Signature:  ____________________________________________________________ Date:  ____________________________________________________________  
  
Lesley Schmitt Provider Signature:  ____________________________________________________________ Date:  ____________________________________________________________

## 2018-02-07 NOTE — PERIOP NOTES
1107-pt presenting to recovery room,  Attempting to scratch nose and accidentally caught MARCELLE drain line, suture intact, MARCELLE drain draining w/o difficulty, reminded pt to be careful of drain and to not touch face. Pt drowsy, responds to verbal.    1145-Pt waking up more now, tolerating ice chips/ sip of water. VSS. Pt reports some discomfort at a 3 and reports this as tolerable. 1207-D/c instructions reviewed with pt and pt's mom, all questions answered. Demonstrated to mom how to \"milk\" line, asked Mom for a return demonstration and she declined, stated, \"I will be fine\", also demonstrated how to empty MARCELLE drain and charge MARCELLE drain and write drainage down. MARCELLE drain walls given to pt. Biopatch applied to drain w/ tegaderm covering. 1232-Transported via w/c to awaiting transportation. Pt denies any nausea. Pt continues to report pain level at a 3 and is tolerable.

## 2018-02-07 NOTE — H&P
[facility]      The history and physical is reviewed by me and updated today. There are no changes from the previous history and physical.  This file should be an external document in the notes section or could be in the media portion of the chart. The risks of the procedure including recurrence, bleeding, infection, problems with anesthesia, need for further procedures, and death have been discussed with the patient. We also discussed the fact that symptoms may not improve or potentially could worsen. Also discussed the alternatives of continued medical management. The patient desires to proceed.     Caitlin Andres MD

## 2018-02-07 NOTE — OP NOTES
NAME: Jona Galicia  MRN: [id number]  DATE: 2/7/2018      PREOPERATIVE DIAGNOSIS: THYROGLOSSAL CYST  POSTOPERATIVE DIAGNOSIS: THYROGLOSSAL CYST    PROCEDURES PERFORMED:  Excision of thyroglossal duct lesion    SURGEON: Glendy Marie MD    ASSISTANT: None. INDICATIONS FOR SURGERY:  Recurrent infection, thyroglossal duct cyst    Complications: none    Specimens: thyroglossal duct cyst, base of tongue, inferior thyroglossal duct    Findings: multilobulated thyroglossal duct cyst extending to base of tongue and down to the level of the cricothyroid membrane. PROCEDURE DETAILS:   After informed consent was obtained from the  patient, the patient was identified and brought to the operating room, and  placed supine on the operating table. General endotracheal anesthesia was  given. A horizontal incision was marked over the thyrohyoid membrane, more  on the right than on the left, over the palpable cyst, and 5 mL of 1%  lidocaine with 1:100,000 epinephrine was injected into the skin and  subcutaneous tissues over the marked incision. The patient was prepped and  draped in the standard surgical fashion. A scalpel was used to sharply incise the skin and subcutaneous tissues, and  divide the platysma fibers. Subplatysmal flaps were elevated superiorly and  inferiorly. A vertical incision was made through the deep tissues just left  of the midline over the thyroid cartilage, and the dissection continued  down below the strap muscles until the inferomedial portion of the cyst was  encountered. Next, the strap muscles were then dissected off of the cyst  and retracted laterally. The insertion of the medial portions of the strap  muscles onto the hyoid bone were resected in order to gain better access to  the cyst and expose it. There was a tract extending from the cyst down to  the top of the thyroid gland.  This was truncated as it went into the  superior pole of the thyroid gland on the right, and a medium clip was left on it. This freed the cyst up and it was rotated medially and superiorly. The cyst was followed superiorly as it dove below  the hyoid bone, and so the middle portion of the hyoid bone was  skeletonized, and a trapezoidal portion of hyoid bone directly above the  cyst was cut using the bone cutters. This allowed the cyst to be traced  superiorly under the hyoid bone, and the superior attachment of the  superior strap muscles were dissected away from the superior portion of the  hyoid. Then the cyst was retracted inferiorly and all of its attachments to  the deep musculature of the tongue were removed. The attachment to the base of tongue was clamped with a medium clip. The cyst was sent off for  permanent section. The wound was then irrigated with copious amounts of  normal saline. There was some tissue from the base of tongue that was separate and some inferior duct tissue that was separate    A small round drain was placed in the wound and secured to the skin with 3-0 nylon. The subcutaneous tissues were reapproximated with 3-0 Vicryl at the level  of the platysma fibers, and then the skin edges were reapproximated using  4-0 Monocryl. Mastisol and Steri-Strips were applied. The patient was  turned back over to the Anesthesia staff, awakened in the operating room,  and transferred to the recovery room awake, alert, in very good condition. At the end of the case, all sponge, instrument, and needle counts were  correct.           EBL: <10ml        Bibi Shahid MD  2/7/2018  11:13 AM

## 2018-02-07 NOTE — PERIOP NOTES
Agustin Horn  1990  [id number]    Situation:  Verbal report given from: Jo Currie RN  Procedure: Procedure(s):  REMOVE THYROID DUCT LESION    Background:    Preoperative diagnosis: THYROGLOSSAL CYST    Postoperative diagnosis: THYROGLOSSAL CYST    :  Dr. Kendrick Peterson    Assistant(s): Circ-1: Justina Osgood, RN  Scrub Tech-1: Ro Sheller  Surg Asst-1: Donny Hernandez    Specimens:   ID Type Source Tests Collected by Time Destination   1 : thyroglossal duct cyst Preservative Neck  [facility] MD 2/7/2018 1019 Pathology   2 : base of tongue Preservative Tongue  [facility] MD 2/7/2018 1025 Pathology   3 : inferior duct Preservative Neck  [facility] MD 2/7/2018 1033 Pathology       Assessment:  Intra-procedure medications         Anesthesia gave intra-procedure sedation and medications, see anesthesia flow sheet     Intravenous fluids: LR@ KVO     Vital signs stable       Recommendation:    Permission to share finding with family or friend yes

## 2018-02-07 NOTE — DISCHARGE INSTRUCTIONS
[facility]    Head and Neck Post Operative Instructions    1. DIET  Start a soft diet and progress to usual diet as tolerated, unless otherwise directed. It is important to remember that good overall diet and health promotes healing. 2.  ACTIVITY  No heavy exertion or heavy lifting for 10 days. Light activities are permitted but, avoid any stretching or bending of the neck for 10 days. 3.  WOUND CARE  A. Apply antibiotic ointment twice daily to suture or staple sites and continue for 2-3 days following removal.  B. May shower or bathe following surgery but make sure to apply antibiotic ointment prior to doing so and do not submerge the area in water. C. If you have a paper tape dressing, make every attempt to keep it dry until it is removed. 4.  THINGS TO BE CONCERNED ABOUT  Please call the office for any of these changes  A. Increasing pain  B. New red discoloration  C. New drainage of any description  D. Increasing warmth of wound    5. DRAIN MANAGEMENT  A. Follow nursing instructions  B. Generally, your doctor will plan to have drains removed between 1 and 3 days. 6. LONG-TERM WOUND HEALING  A. Expect the wound to have a slight ridge for up to 6 weeks. This is usually by design for optimal wound healing. B. Local numbness is usual around wound sites, and can last up to and beyond 3 months. C. To minimize the prolonged redness or pigmentation around a wound, we recommend use of sunscreen for 3 months or longer on healed wounds. If you have any questions or concerns following your surgery, do not hesitate to contact our office at    Office Phone:  [phone] office hours are 8:00 a.m. to 4:30 p.m. You should be able to reach us after hours by calling the regular office number.   If for some reason you are not able to reach our [address] [place] [address] service through this main number you may call them directly at [phone]. DO NOT TAKE TYLENOL/ACETAMINOPHEN WITH PERCOCET, [facility]ORT[facility], [address] TAKE NARCOTIC PAIN MEDICATIONS WITH FOOD     If given 2 pain narcotics do NOT take together! Narcotics tend to be constipating, we suggest taking a stool softener such as Colace or Miralax (follow package instructions). DO NOT DRIVE WHILE TAKING NARCOTIC PAIN MEDICATIONS. DO NOT TAKE SLEEPING MEDICATIONS OR ANTIANXIETY MEDICATIONS WHILE TAKING NARCOTIC PAIN MEDICATIONS,  ESPECIALLY THE NIGHT OF ANESTHESIA. CPAP PATIENTS BE SURE TO WEAR MACHINE WHENEVER NAPPING OR SLEEPING. DISCHARGE SUMMARY from Nurse    The following personal items collected during your admission are returned to you:   Dental Appliance: Dental Appliances: None  Vision: Visual Aid: None  Hearing Aid:    Jewelry: Jewelry: None  Clothing: Clothing: Other (comment) (belonging bag)  Other Valuables: Other Valuables: Cell Phone (given to mom)  Valuables sent to safe:        PATIENT INSTRUCTIONS:    After General Anesthesia or Intravenous Sedation, for 24 hours or while taking prescription Narcotics:        Someone should be with you for the next 24 hours. For your own safety, a responsible adult must drive you home. · Limit your activities  · Recommended activity: Rest today, up with assistance today. Do not climb stairs or shower unattended for the next 24 hours. · Please start with a soft bland diet and advance as tolerated (no nausea) to regular diet. · If you have a sore throat you should try the following: fluids, warm salt water gargles, or throat lozenges. If it does not improve after several days please follow up with your primary physician. · Do not drive and operate hazardous machinery  · Do not make important personal or business decisions  · Do  not drink alcoholic beverages  · If you have not urinated within 8 hours after discharge, please contact your surgeon on call.     Report the following to your surgeon:  · Excessive pain, swelling, redness or odor of or around the surgical area  · Temperature over 100.5  · Nausea and vomiting lasting longer than 4 hours or if unable to take medications  · Any signs of decreased circulation or nerve impairment to extremity: change in color, persistent  numbness, tingling, coldness or increase pain      · You will receive a Post Operative Call from one of the Recovery Room Nurses on the day after your surgery to check on you. It is very important for us to know how you are recovering after your surgery. If you have an issue or need to speak with someone, please call your surgeon, do not wait for the post operative call. · You may receive an e-mail or letter in the mail from [employer] regarding your experience with us in the Ambulatory Surgery Unit. Your feedback is valuable to us and we appreciate your participation in the survey. · If the above instructions are not adequate, please contact Ashlie Pulido RN, [facility] anesthesia Nurse Manager or our Anesthesiologist, at [phone]. If you are having problems after your surgery, call the physician at his office number. · We wish you a speedy recovery ? What to do at Home:      *  Please give a list of your current medications to your Primary Care Provider. *  Please update this list whenever your medications are discontinued, doses are      changed, or new medications (including over-the-counter products) are added. *  Please carry medication information at all times in case of emergency situations. These are general instructions for a healthy lifestyle:    No smoking/ No tobacco products/ Avoid exposure to second hand smoke    Surgeon General's Warning:  Quitting smoking now greatly reduces serious risk to your health.     Obesity, smoking, and sedentary lifestyle greatly increases your risk for illness    A healthy diet, regular physical exercise & weight monitoring are important for maintaining a healthy lifestyle    You may be retaining fluid if you have a history of heart failure or if you experience any of the following symptoms:  Weight gain of 3 pounds or more overnight or 5 pounds in a week, increased swelling in our hands or feet or shortness of breath while lying flat in bed. Please call your doctor as soon as you notice any of these symptoms; do not wait until your next office visit. Recognize signs and symptoms of STROKE:    B - Balance  E - Eyes    F-  Face looks uneven    A-  Arms unable to move or move even    S-  Speech slurred or non-existent    T-  Time-call 911 as soon as signs and symptoms begin-DO NOT go       Back to bed or wait to see if you get better-TIME IS BRAIN. If you have not received your influenza and/or pneumococcal vaccine, please follow up with your primary care physician. The discharge information has been reviewed with the patient and caregiver. The patient and caregiver verbalized understanding.

## 2018-02-07 NOTE — ANESTHESIA PREPROCEDURE EVALUATION
Anesthetic History   No history of anesthetic complications            Review of Systems / Medical History  Patient summary reviewed, nursing notes reviewed and pertinent labs reviewed    Pulmonary  Within defined limits                 Neuro/Psych         Headaches (migraines)     Cardiovascular  Within defined limits                Exercise tolerance: >4 METS     GI/Hepatic/Renal  Within defined limits              Endo/Other  Within defined limits           Other Findings              Physical Exam    Airway  Mallampati: I  TM Distance: > 6 cm  Neck ROM: normal range of motion   Mouth opening: Normal     Cardiovascular    Rhythm: regular  Rate: normal      Pertinent negatives: No murmur   Dental  No notable dental hx       Pulmonary  Breath sounds clear to auscultation               Abdominal  GI exam deferred       Other Findings            Anesthetic Plan    ASA: 2  Anesthesia type: general    Monitoring Plan: BIS      Induction: Intravenous  Anesthetic plan and risks discussed with: Patient

## 2018-02-07 NOTE — ANESTHESIA POSTPROCEDURE EVALUATION
Post-Anesthesia Evaluation and Assessment    Patient: Shalonda Padilla MRN: [id number]  SSN: [id number]    YOB: 1990  Age: 24 y.o. Sex: male       Cardiovascular Function/Vital Signs  Visit Vitals    /53 (BP 1 Location: Right arm, BP Patient Position: At rest)    Pulse (!) 51    Temp 36.6 °C (97.9 °F)    Resp 10    Ht 6' 1\" (1.854 m)    Wt 85 kg (187 lb 6 oz)    SpO2 96%    BMI 24.72 kg/m2       Patient is status post general anesthesia for Procedure(s):  REMOVE THYROID DUCT LESION. Nausea/Vomiting: None    Postoperative hydration reviewed and adequate. Pain:  Pain Scale 1: Numeric (0 - 10) (02/07/18 1207)  Pain Intensity 1: 3 (02/07/18 1207)   Managed    Neurological Status:   Neuro (WDL): Within Defined Limits (02/07/18 1207)  Neuro  Neurologic State: Alert (02/07/18 1207)   At baseline    Mental Status and Level of Consciousness: Arousable    Pulmonary Status:   O2 Device: Room air (02/07/18 1207)   Adequate oxygenation and airway patent    Complications related to anesthesia: None    Post-anesthesia assessment completed.  No concerns    Signed By: Krishna Rich MD     February 7, 2018

## 2018-04-25 ENCOUNTER — TELEPHONE (OUTPATIENT)
Dept: NEUROLOGY | Age: 28
End: 2018-04-25

## 2018-04-25 NOTE — TELEPHONE ENCOUNTER
Pt is having memory loss and constant headaches. He is scheduled for a f/u on 6/4 but would like to be seen sooner.  Please call back

## 2018-05-02 ENCOUNTER — DOCUMENTATION ONLY (OUTPATIENT)
Dept: NEUROLOGY | Age: 28
End: 2018-05-02

## 2018-05-02 ENCOUNTER — OFFICE VISIT (OUTPATIENT)
Dept: NEUROLOGY | Age: 28
End: 2018-05-02

## 2018-05-02 VITALS
BODY MASS INDEX: 24.86 KG/M2 | SYSTOLIC BLOOD PRESSURE: 106 MMHG | OXYGEN SATURATION: 96 % | RESPIRATION RATE: 18 BRPM | DIASTOLIC BLOOD PRESSURE: 64 MMHG | HEART RATE: 84 BPM | WEIGHT: 187.6 LBS | HEIGHT: 73 IN

## 2018-05-02 DIAGNOSIS — G44.221 CHRONIC TENSION-TYPE HEADACHE, INTRACTABLE: Primary | ICD-10-CM

## 2018-05-02 DIAGNOSIS — G43.009 MIGRAINE WITHOUT AURA AND WITHOUT STATUS MIGRAINOSUS, NOT INTRACTABLE: ICD-10-CM

## 2018-05-02 DIAGNOSIS — R41.3 MEMORY LOSS: ICD-10-CM

## 2018-05-02 NOTE — MR AVS SNAPSHOT
[place] [address] [address] 
[phone] Patient: Anabell York MRN: [id number] TRK:[birth year] Visit Information Date & Time Provider Department Dept. Phone Encounter #  
 5/2/2018  8:30 AM [facility] at [facility] [id number] Your Appointments 6/4/2018 10:40 AM  
Follow Up with DO [facility] at [address] [address] [place] [address]) Appt Note: f/u 4/25/2018   
 [address] [address]  
[phone]  
  
   
 [address] [place] [id number] Upcoming Health Maintenance Date Due DTaP/Tdap/Td series (1 - Tdap) 3/3/2013 Influenza Age 5 to Adult 8/1/2018 Allergies as of 5/2/2018  Review Complete On: 5/2/2018 By: Elvira Houston NP Severity Noted Reaction Type Reactions Banana Medium 10/28/2015    Itching Current Immunizations  Never Reviewed No immunizations on file. Not reviewed this visit You Were Diagnosed With   
  
 Codes Comments Chronic tension-type headache, intractable    -  Primary ICD-10-CM: F79.367 ICD-9-CM: 339.12 Migraine without aura and without status migrainosus, not intractable     ICD-10-CM: O75.710 ICD-9-CM: 346.10 Memory loss     ICD-10-CM: R41.3 ICD-9-CM: 780.93 Vitals Height(growth percentile) Smoking Status 6' 1\" (1.854 m) Never Smoker Preferred Pharmacy Pharmacy Name Phone [facility] [address] , [address] [place] [address] Lai Schaffer [phone] Your Updated Medication List  
  
   
This list is accurate as of 5/2/18  9:01 AM.  Always use your most recent med list.  
  
  
  
  
 aspirin-acetaminophen-caffeine 250-250-65 mg per tablet Commonly known as:  EXCEDRIN ES Take 1 Tab by mouth. topiramate  mg capsule Commonly known as:  TROKENDI XR Take 1 Cap by mouth daily. Prescriptions Sent to Pharmacy Refills  
 topiramate ER (TROKENDI XR) 100 mg capsule 5 Sig: Take 1 Cap by mouth daily. Class: Normal  
 Pharmacy: [address] Gallegos, [facility] [address] [facility]  [address]  #: [phone] Route: Oral  
  
We Performed the Following REFERRAL TO NEUROPSYCHOLOGY [KBH96 Custom] Comments:  
 Memory loss post concussion Referral Information Referral ID Referred By Referred To  
  
 [id number] [facility] [address] [facility], [address] [place] Phone: [phone] Fax: [phone] Visits Status Start Date End Date 1 New Request 5/2/18 5/2/19 If your referral has a status of pending review or denied, additional information will be sent to support the outcome of this decision. Patient Instructions I have put in a referral for you to have memory testing done because it would take about 6 months to get that scheduled If your memory has improved by the time that testing arrives, he can cancel the appointment I have given you samples of Trokendi, start with 25 mg 1 pill at bedtime Then take 50 mg 1 pill at bedtime Then take one 50 mg pill and one 25 mg pill together at bedtime Then you will start the full prescription dose, 100 mg, this is been sent into your pharmacy PRESCRIPTION REFILL POLICY [facility] Statement to Patients April 1, 2014 In an effort to ensure the large volume of patient prescription refills is processed in the most efficient and expeditious manner, we are asking our patients to assist us by calling your Pharmacy for all prescription refills, this will include also your  Mail Order Pharmacy.  The pharmacy will contact our office electronically to continue the refill process. Please do not wait until the last minute to call your pharmacy. We need at least 48 hours (2days) to fill prescriptions. We also encourage you to call your pharmacy before going to  your prescription to make sure it is ready. With regard to controlled substance prescription refill requests (narcotic refills) that need to be picked up at our office, we ask your cooperation by providing us with at least 72 hours (3days) notice that you will need a refill. We will not refill narcotic prescription refill requests after 4:00pm on any weekday, Monday through Thursday, or after 2:00pm on Fridays, or on the weekends. We encourage everyone to explore another way of getting your prescription refill request processed using [employer], our patient web portal through our electronic medical record system. [employer] is an efficient and effective way to communicate your medication request directly to the office and  downloadable as an dante on your smart phone . [employer] also features a review functionality that allows you to view your medication list as well as leave messages for your physician. Are you ready to get connected? If so please review the attatched instructions or speak to any of our staff to get you set up right away! Thank you so much for your cooperation. Should you have any questions please contact our Practice Administrator. The Physicians and Staff,  [facility] A Healthy Lifestyle: Care Instructions Your Care Instructions A healthy lifestyle can help you feel good, stay at a healthy weight, and have plenty of energy for both work and play. A healthy lifestyle is something you can share with your whole family. A healthy lifestyle also can lower your risk for serious health problems, such as high blood pressure, heart disease, and diabetes. You can follow a few steps listed below to improve your health and the health of your family. Follow-up care is a key part of your treatment and safety. Be sure to make and go to all appointments, and call your doctor if you are having problems. It's also a good idea to know your test results and keep a list of the medicines you take. How can you care for yourself at home? · Do not eat too much sugar, fat, or fast foods. You can still have dessert and treats now and then. The goal is moderation. · Start small to improve your eating habits. Pay attention to portion sizes, drink less juice and soda pop, and eat more fruits and vegetables. ¨ Eat a healthy amount of food. A 3-ounce serving of meat, for example, is about the size of a deck of cards. Fill the rest of your plate with vegetables and whole grains. ¨ Limit the amount of soda and sports drinks you have every day. Drink more water when you are thirsty. ¨ Eat at least 5 servings of fruits and vegetables every day. It may seem like a lot, but it is not hard to reach this goal. A serving or helping is 1 piece of fruit, 1 cup of vegetables, or 2 cups of leafy, raw vegetables. Have an apple or some carrot sticks as an afternoon snack instead of a candy bar. Try to have fruits and/or vegetables at every meal. 
· Make exercise part of your daily routine. You may want to start with simple activities, such as walking, bicycling, or slow swimming. Try to be active 30 to 60 minutes every day. You do not need to do all 30 to 60 minutes all at once. For example, you can exercise 3 times a day for 10 or 20 minutes. Moderate exercise is safe for most people, but it is always a good idea to talk to your doctor before starting an exercise program. 
· Keep moving. [place] the lawn, work in the garden, or Simple[employer]let. Take the stairs instead of the elevator at work. · If you smoke, quit.  People who smoke have an increased risk for heart attack, stroke, cancer, and other lung illnesses. Quitting is hard, but there are ways to boost your chance of quitting tobacco for good. ¨ Use nicotine gum, patches, or lozenges. ¨ Ask your doctor about stop-smoking programs and medicines. ¨ Keep trying. In addition to reducing your risk of diseases in the future, you will notice some benefits soon after you stop using tobacco. If you have shortness of breath or asthma symptoms, they will likely get better within a few weeks after you quit. · Limit how much alcohol you drink. Moderate amounts of alcohol (up to 2 drinks a day for men, 1 drink a day for women) are okay. But drinking too much can lead to liver problems, high blood pressure, and other health problems. Family health If you have a family, there are many things you can do together to improve your health. · Eat meals together as a family as often as possible. · Eat healthy foods. This includes fruits, vegetables, lean meats and dairy, and whole grains. · Include your family in your fitness plan. Most people think of activities such as jogging or tennis as the way to fitness, but there are many ways you and your family can be more active. Anything that makes you breathe hard and gets your heart pumping is exercise. Here are some tips: 
¨ Walk to do errands or to take your child to school or the bus. ¨ Go for a family bike ride after dinner instead of watching TV. Where can you learn more? Go to http://vanda-aleena.info/. Enter J821 in the search box to learn more about \"A Healthy Lifestyle: Care Instructions. \" Current as of: May 12, 2017 Content Version: 11.4 © 7770-5299 [employer]. Care instructions adapted under license by [employer] (which disclaims liability or warranty for this information).  If you have questions about a medical condition or this instruction, always ask your healthcare professional. [employer] disclaims any warranty or liability for your use of this information. Introducing [facility]! Dear Allegra Louise: 
Thank you for requesting a FRAMED account. Our records indicate that you already have an active FRAMED account. You can access your account anytime at [email or web address] Did you know that you can access your hospital and ER discharge instructions at any time in FRAMED? You can also review all of your test results from your hospital stay or ER visit. Additional Information If you have questions, please visit the Frequently Asked Questions section of the FRAMED website at [email or web address] Remember, FRAMED is NOT to be used for urgent needs. For medical emergencies, dial 911. Now available from your iPhone and Android! Please provide this summary of care documentation to your next provider. Your primary care clinician is listed as [facility]. If you have any questions after today's visit, please call [phone].

## 2018-05-02 NOTE — PATIENT INSTRUCTIONS
I have put in a referral for you to have memory testing done because it would take about 6 months to get that scheduled  If your memory has improved by the time that testing arrives, he can cancel the appointment    I have given you samples of Trokendi, start with 25 mg 1 pill at bedtime  Then take 50 mg 1 pill at bedtime  Then take one 50 mg pill and one 25 mg pill together at bedtime    Then you will start the full prescription dose, 100 mg, this is been sent into your pharmacy      PRESCRIPTION [id number]   Statement to Patients  April 1, 2014      In an effort to ensure the large volume of patient prescription refills is processed in the most efficient and expeditious manner, we are asking our patients to assist us by calling your Pharmacy for all prescription refills, this will include also your  Mail Order Pharmacy. The pharmacy will contact our office electronically to continue the refill process. Please do not wait until the last minute to call your pharmacy. We need at least 48 hours (2days) to fill prescriptions. We also encourage you to call your pharmacy before going to  your prescription to make sure it is ready. With regard to controlled substance prescription refill requests (narcotic refills) that need to be picked up at our office, we ask your cooperation by providing us with at least 72 hours (3days) notice that you will need a refill. We will not refill narcotic prescription refill requests after 4:00pm on any weekday, Monday through Thursday, or after 2:00pm on Fridays, or on the weekends. We encourage everyone to explore another way of getting your prescription refill request processed using SourceYour[employer], our patient web portal through our electronic medical record system. [facility]Your[facility] is an efficient and effective way to communicate your medication request directly to the office and  downloadable as an dante on your smart phone .  [facility]Your[facility] also features a review functionality that allows you to view your medication list as well as leave messages for your physician. Are you ready to get connected? If so please review the attatched instructions or speak to any of our staff to get you set up right away! Thank you so much for your cooperation. Should you have any questions please contact our Practice Administrator. The Physicians and Staff,  [facility]              A Healthy Lifestyle: Care Instructions  Your Care Instructions    A healthy lifestyle can help you feel good, stay at a healthy weight, and have plenty of energy for both work and play. A healthy lifestyle is something you can share with your whole family. A healthy lifestyle also can lower your risk for serious health problems, such as high blood pressure, heart disease, and diabetes. You can follow a few steps listed below to improve your health and the health of your family. Follow-up care is a key part of your treatment and safety. Be sure to make and go to all appointments, and call your doctor if you are having problems. It's also a good idea to know your test results and keep a list of the medicines you take. How can you care for yourself at home? · Do not eat too much sugar, fat, or fast foods. You can still have dessert and treats now and then. The goal is moderation. · Start small to improve your eating habits. Pay attention to portion sizes, drink less juice and soda pop, and eat more fruits and vegetables. ¨ Eat a healthy amount of food. A 3-ounce serving of meat, for example, is about the size of a deck of cards. Fill the rest of your plate with vegetables and whole grains. ¨ Limit the amount of soda and sports drinks you have every day. Drink more water when you are thirsty. ¨ Eat at least 5 servings of fruits and vegetables every day.  It may seem like a lot, but it is not hard to reach this goal. A serving or helping is 1 piece of fruit, 1 cup of vegetables, or 2 cups of leafy, raw vegetables. Have an apple or some carrot sticks as an afternoon snack instead of a candy bar. Try to have fruits and/or vegetables at every meal.  · Make exercise part of your daily routine. You may want to start with simple activities, such as walking, bicycling, or slow swimming. Try to be active 30 to 60 minutes every day. You do not need to do all 30 to 60 minutes all at once. For example, you can exercise 3 times a day for 10 or 20 minutes. Moderate exercise is safe for most people, but it is always a good idea to talk to your doctor before starting an exercise program.  · Keep moving. Renelle Freshwater the lawn, work in the garden, or Grabbed. Take the stairs instead of the elevator at work. · If you smoke, quit. People who smoke have an increased risk for heart attack, stroke, cancer, and other lung illnesses. Quitting is hard, but there are ways to boost your chance of quitting tobacco for good. ¨ Use nicotine gum, patches, or lozenges. ¨ Ask your doctor about stop-smoking programs and medicines. ¨ Keep trying. In addition to reducing your risk of diseases in the future, you will notice some benefits soon after you stop using tobacco. If you have shortness of breath or asthma symptoms, they will likely get better within a few weeks after you quit. · Limit how much alcohol you drink. Moderate amounts of alcohol (up to 2 drinks a day for men, 1 drink a day for women) are okay. But drinking too much can lead to liver problems, high blood pressure, and other health problems. Family health  If you have a family, there are many things you can do together to improve your health. · Eat meals together as a family as often as possible. · Eat healthy foods. This includes fruits, vegetables, lean meats and dairy, and whole grains. · Include your family in your fitness plan.  Most people think of activities such as jogging or tennis as the way to fitness, but there are many ways you and your family can be more active. Anything that makes you breathe hard and gets your heart pumping is exercise. Here are some tips:  ¨ Walk to do errands or to take your child to school or the bus. ¨ Go for a family bike ride after dinner instead of watching TV. Where can you learn more? Go to http://vanda-aleena.info/. Enter I773 in the search box to learn more about \"A Healthy Lifestyle: Care Instructions. \"  Current as of: May 12, 2017  Content Version: 11.4  © 7820-8400 [employer]. Care instructions adapted under license by [employer] (which disclaims liability or warranty for this information). If you have questions about a medical condition or this instruction, always ask your healthcare professional. [address] any warranty or liability for your use of this information.
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
## 2018-05-09 ENCOUNTER — DOCUMENTATION ONLY (OUTPATIENT)
Dept: NEUROLOGY | Age: 28
End: 2018-05-09

## 2018-05-09 NOTE — PROGRESS NOTES
Documentation for approval of patients Trokendi:    Patient has tried topiramate in the past for migraine preventative, had good therapeutic response but had very bothersome numbness and tingling. He is employed as a , it would be inappropriate to put him back on a medication that causes numbness and tingling as it would potentially jeopardize his safety of himself, his coworkers, and others.     He has tried an appropriate step 1 therapy for migraine and failed it due to side effects, so it is my clinical opinion that he is a good candidate for Trokendi as it has better therapeutic response and also reduced side effects

## 2018-05-16 ENCOUNTER — TELEPHONE (OUTPATIENT)
Dept: NEUROLOGY | Age: 28
End: 2018-05-16

## 2018-05-16 NOTE — TELEPHONE ENCOUNTER
Re: Trokendi XR    Approved by [employer] via CM. Auth good 4/16/18 - 5/16/19. Roc Honeycutt ([place]: Charli Urban)   Rx #: [id number]   Trokendi XR 100MG er capsules   Form  [employer] Electronic PA Form   Created   14 days ago   Sent to Plan   2 hours ago   Plan Response   2 hours ago   Submit Clinical Questions   2 hours ago   Determination   Favorable   2 hours ago   Message from Rao Gray; Coverage Start Date:04/16/2018; Coverage End Date:05/16/2019;      Manually faxed approval to [facility].   Fax confirmation received 5/16/18 @ 4:28pm.

## 2018-05-16 NOTE — TELEPHONE ENCOUNTER
Re: Trokendi XR    PA submitted via [employer] to [employer]. Pending status. \"[employer] is reviewing your PA request and will respond within 24-72 hours. To check for an update later, open this request from your dashboard. \"    Will update when determination has been made.

## 2018-06-04 ENCOUNTER — OFFICE VISIT (OUTPATIENT)
Dept: NEUROLOGY | Age: 28
End: 2018-06-04

## 2018-06-04 VITALS
RESPIRATION RATE: 14 BRPM | SYSTOLIC BLOOD PRESSURE: 120 MMHG | DIASTOLIC BLOOD PRESSURE: 70 MMHG | BODY MASS INDEX: 24.65 KG/M2 | OXYGEN SATURATION: 97 % | WEIGHT: 186 LBS | HEART RATE: 81 BPM | HEIGHT: 73 IN

## 2018-06-04 DIAGNOSIS — G43.009 MIGRAINE WITHOUT AURA AND WITHOUT STATUS MIGRAINOSUS, NOT INTRACTABLE: ICD-10-CM

## 2018-06-04 DIAGNOSIS — G44.221 CHRONIC TENSION-TYPE HEADACHE, INTRACTABLE: Primary | ICD-10-CM

## 2018-06-04 DIAGNOSIS — R06.83 SNORING: ICD-10-CM

## 2018-06-04 NOTE — MR AVS SNAPSHOT
[facility] [address] [place] [address] 
[phone] Patient: Roc Honeycutt MRN: [id number] JYS:[birth year] Visit Information Date & Time Provider Department Dept. Phone Encounter #  
 6/4/2018 10:40 AM DO Lenin Buckley [facility] [place] [id number] Follow-up Instructions Return in about 2 months (around 8/13/2018). Routing History Your Appointments 12/19/2018 10:40 AM  
New Patient with Devaughn Arrington PsyD [address] ([address]) Appt Note: new patient memory loss/ Adrain Chloride [place] [address] [place] [address] [phone]  
  
   
 [place] [address]  
  
    
 12/19/2018  1:00 PM  
Any with Devaughn Arrington PsyD [address] ([address]) Appt Note: testing/ Adrian Starla [place] [address] [place] [address] [phone]  
  
   
 [place] [address] [id number] [address] Upcoming Health Maintenance Date Due DTaP/Tdap/Td series (1 - Tdap) 3/3/2013 Influenza Age 5 to Adult 8/1/2018 Allergies as of 6/4/2018  Review Complete On: 6/4/2018 By: Kalani Skinner Severity Noted Reaction Type Reactions Banana Medium 10/28/2015    Itching Current Immunizations  Never Reviewed No immunizations on file. Not reviewed this visit You Were Diagnosed With   
  
 Codes Comments Chronic tension-type headache, intractable    -  Primary ICD-10-CM: Q14.420 ICD-9-CM: 339.12 Snoring     ICD-10-CM: R06.83 
ICD-9-CM: 786.09 Migraine without aura and without status migrainosus, not intractable     ICD-10-CM: J43.994 ICD-9-CM: 346.10 Vitals BP Pulse Resp Height(growth percentile) Weight(growth percentile) SpO2 120/70 81 14 6' 1\" (1.854 m) 186 lb (84.4 kg) 97% BMI Smoking Status 24.54 kg/m2 Never Smoker Vitals History BMI and BSA Data Body Mass Index Body Surface Area 24.54 kg/m 2 2.08 m 2 Preferred Pharmacy Pharmacy Name Phone [facility] [address] Elsa [place]aca [phone] Your Updated Medication List  
  
   
This list is accurate as of 6/4/18 11:03 AM.  Always use your most recent med list.  
  
  
  
  
 aspirin-acetaminophen-caffeine 250-250-65 mg per tablet Commonly known as:  EXCEDRIN ES Take 1 Tab by mouth. * topiramate ER 25 mg capsule Commonly known as:  TROKENDI XR Take 1 Cap by mouth daily. * topiramate ER 50 mg capsule Commonly known as:  TROKENDI XR Take 1 Cap by mouth daily. * topiramate  mg capsule Commonly known as:  TROKENDI XR Take 1 Cap by mouth daily. * Notice: This list has 3 medication(s) that are the same as other medications prescribed for you. Read the directions carefully, and ask your doctor or other care provider to review them with you. Prescriptions Printed Refills  
 topiramate ER (TROKENDI XR) 100 mg capsule 3 Sig: Take 1 Cap by mouth daily. Class: Print Route: Oral  
  
We Performed the Following REFERRAL TO ENT-OTOLARYNGOLOGY [WVB92 Custom] Comments:  
 Severe snoring, chronic headaches Follow-up Instructions Return in about 2 months (around 8/13/2018). Referral Information Referral ID Referred By Referred To  
  
 [id number] Farideh Garcia MD   
   [address] [facility]   
   [address] [facility], [address] [place] [address] Phone: [phone] Fax: [phone] Visits Status Start Date End Date 1 New Request 6/4/18 6/4/19  If your referral has a status of pending review or denied, additional information will be sent to support the outcome of this decision. Patient Instructions A Healthy Lifestyle: Care Instructions Your Care Instructions A healthy lifestyle can help you feel good, stay at a healthy weight, and have plenty of energy for both work and play. A healthy lifestyle is something you can share with your whole family. A healthy lifestyle also can lower your risk for serious health problems, such as high blood pressure, heart disease, and diabetes. You can follow a few steps listed below to improve your health and the health of your family. Follow-up care is a key part of your treatment and safety. Be sure to make and go to all appointments, and call your doctor if you are having problems. It's also a good idea to know your test results and keep a list of the medicines you take. How can you care for yourself at home? · Do not eat too much sugar, fat, or fast foods. You can still have dessert and treats now and then. The goal is moderation. · Start small to improve your eating habits. Pay attention to portion sizes, drink less juice and soda pop, and eat more fruits and vegetables. ¨ Eat a healthy amount of food. A 3-ounce serving of meat, for example, is about the size of a deck of cards. Fill the rest of your plate with vegetables and whole grains. ¨ Limit the amount of soda and sports drinks you have every day. Drink more water when you are thirsty. ¨ Eat at least 5 servings of fruits and vegetables every day. It may seem like a lot, but it is not hard to reach this goal. A serving or helping is 1 piece of fruit, 1 cup of vegetables, or 2 cups of leafy, raw vegetables. Have an apple or some carrot sticks as an afternoon snack instead of a candy bar. Try to have fruits and/or vegetables at every meal. 
· Make exercise part of your daily routine. You may want to start with simple activities, such as walking, bicycling, or slow swimming.  Try to be active 30 to 60 minutes every day. You do not need to do all 30 to 60 minutes all at once. For example, you can exercise 3 times a day for 10 or 20 minutes. Moderate exercise is safe for most people, but it is always a good idea to talk to your doctor before starting an exercise program. 
· Keep moving. Jenise Gersonine the lawn, work in the garden, or [employer]ws. Take the stairs instead of the elevator at work. · If you smoke, quit. People who smoke have an increased risk for heart attack, stroke, cancer, and other lung illnesses. Quitting is hard, but there are ways to boost your chance of quitting tobacco for good. ¨ Use nicotine gum, patches, or lozenges. ¨ Ask your doctor about stop-smoking programs and medicines. ¨ Keep trying. In addition to reducing your risk of diseases in the future, you will notice some benefits soon after you stop using tobacco. If you have shortness of breath or asthma symptoms, they will likely get better within a few weeks after you quit. · Limit how much alcohol you drink. Moderate amounts of alcohol (up to 2 drinks a day for men, 1 drink a day for women) are okay. But drinking too much can lead to liver problems, high blood pressure, and other health problems. Family health If you have a family, there are many things you can do together to improve your health. · Eat meals together as a family as often as possible. · Eat healthy foods. This includes fruits, vegetables, lean meats and dairy, and whole grains. · Include your family in your fitness plan. Most people think of activities such as jogging or tennis as the way to fitness, but there are many ways you and your family can be more active. Anything that makes you breathe hard and gets your heart pumping is exercise. Here are some tips: 
¨ Walk to do errands or to take your child to school or the bus. ¨ Go for a family bike ride after dinner instead of watching TV. Where can you learn more? Go to http://vanda-aleena.info/. Enter F283 in the search box to learn more about \"A Healthy Lifestyle: Care Instructions. \" Current as of: May 12, 2017 Content Version: 11.4 © 1420-2464 [employer]. Care instructions adapted under license by [employer] (which disclaims liability or warranty for this information). If you have questions about a medical condition or this instruction, always ask your healthcare professional. [address] any warranty or liability for your use of this information. Introducing [facility]! Dear Arianne Sarmiento: 
Thank you for requesting a RABBL account. Our records indicate that you already have an active RABBL account. You can access your account anytime at [email or web address]. [email or web address] Did you know that you can access your hospital and ER discharge instructions at any time in RABBL? You can also review all of your test results from your hospital stay or ER visit. Additional Information If you have questions, please visit the Frequently Asked Questions section of the RABBL website at [email or web address] Remember, RABBL is NOT to be used for urgent needs. For medical emergencies, dial 911. Now available from your iPhone and Android! Please provide this summary of care documentation to your next provider. Your primary care clinician is listed as [facility]. If you have any questions after today's visit, please call [phone].

## 2018-06-04 NOTE — PROGRESS NOTES
Patient is here for follow up for headaches  Patient states headaches are better  More intense but less frequent
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
## 2018-06-04 NOTE — PATIENT INSTRUCTIONS

## 2018-09-10 ENCOUNTER — OFFICE VISIT (OUTPATIENT)
Dept: NEUROLOGY | Age: 28
End: 2018-09-10

## 2018-09-10 VITALS
WEIGHT: 163 LBS | RESPIRATION RATE: 14 BRPM | SYSTOLIC BLOOD PRESSURE: 120 MMHG | HEIGHT: 73 IN | BODY MASS INDEX: 21.6 KG/M2 | DIASTOLIC BLOOD PRESSURE: 80 MMHG | HEART RATE: 61 BPM | OXYGEN SATURATION: 98 %

## 2018-09-10 DIAGNOSIS — G44.221 CHRONIC TENSION-TYPE HEADACHE, INTRACTABLE: Primary | ICD-10-CM

## 2018-09-10 DIAGNOSIS — R06.83 SNORING: ICD-10-CM

## 2018-09-10 DIAGNOSIS — G43.009 MIGRAINE WITHOUT AURA AND WITHOUT STATUS MIGRAINOSUS, NOT INTRACTABLE: ICD-10-CM

## 2018-09-10 NOTE — MR AVS SNAPSHOT
[place] [address] [address] 
[phone] Patient: Vivian Conde MRN: [id number] HAY:[id number] Visit Information Date & Time Provider Department Dept. Phone Encounter #  
 9/10/2018 10:40 AM George Snow DO [facility] at [address] [id number] Follow-up Instructions Return in about 3 months (around 12/10/2018). Your Appointments 12/19/2018 10:40 AM  
New Patient with Geoff Cortez PsyD [address] ([address]) Appt Note: new patient memory loss/ Adrian [facility] [place] [address] [place] [address] [phone]  
  
   
 [place] [address]  
  
    
 12/19/2018  1:00 PM  
Any with Tellez Friant, PsyD [address] ([address]) Appt Note: testing/ Adrian [facility] [place] [address] [id number] [phone]  
  
   
 [place] [address] [id number] [address] Upcoming Health Maintenance Date Due DTaP/Tdap/Td series (1 - Tdap) 3/3/2013 Influenza Age 5 to Adult 8/1/2018 Allergies as of 9/10/2018  Review Complete On: 9/10/2018 By: Tara Inman DO Severity Noted Reaction Type Reactions Banana Medium 10/28/2015    Itching Current Immunizations  Never Reviewed No immunizations on file. Not reviewed this visit You Were Diagnosed With   
  
 Codes Comments Chronic tension-type headache, intractable    -  Primary ICD-10-CM: W60.406 ICD-9-CM: 339.12 Migraine without aura and without status migrainosus, not intractable     ICD-10-CM: Z31.946 ICD-9-CM: 346.10 Snoring     ICD-10-CM: R06.83 
ICD-9-CM: 786.09 Vitals BP Pulse Resp Height(growth percentile) Weight(growth percentile) SpO2 120/80 61 14 6' 1\" (1.854 m) 163 lb (73.9 kg) 98% BMI Smoking Status 21.51 kg/m2 Never Smoker Vitals History BMI and BSA Data Body Mass Index Body Surface Area  
 21.51 kg/m 2 1.95 m 2 Preferred Pharmacy Pharmacy Name Phone [facility] [address], [address] [place] [address] Afshan Us [phone] Your Updated Medication List  
  
   
This list is accurate as of 9/10/18 11:01 AM.  Always use your most recent med list.  
  
  
  
  
 aspirin-acetaminophen-caffeine 250-250-65 mg per tablet Commonly known as:  EXCEDRIN ES Take 1 Tab by mouth. topiramate  mg capsule Commonly known as:  TROKENDI XR Take 1 Cap by mouth daily. Follow-up Instructions Return in about 3 months (around 12/10/2018). Introducing [facility] SERVICES! Dear Cherrie Sinhg: 
Thank you for requesting a [employer] account. Our records indicate that you already have an active [employer] account. You can access your account anytime at [email or web address] Did you know that you can access your hospital and ER discharge instructions at any time in [employer]? You can also review all of your test results from your hospital stay or ER visit. Additional Information If you have questions, please visit the Frequently Asked Questions section of the [employer] website at [email or web address]. Remember, [employer] is NOT to be used for urgent needs. For medical emergencies, dial 911. Now available from your iPhone and Android! Please provide this summary of care documentation to your next provider. Your primary care clinician is listed as KINGS Bee. If you have any questions after today's visit, please call [phone].
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
## 2019-01-08 ENCOUNTER — OFFICE VISIT (OUTPATIENT)
Dept: NEUROLOGY | Age: 29
End: 2019-01-08

## 2019-01-08 VITALS
OXYGEN SATURATION: 99 % | RESPIRATION RATE: 16 BRPM | BODY MASS INDEX: 22 KG/M2 | HEART RATE: 61 BPM | DIASTOLIC BLOOD PRESSURE: 58 MMHG | SYSTOLIC BLOOD PRESSURE: 94 MMHG | HEIGHT: 73 IN | WEIGHT: 166 LBS

## 2019-01-08 DIAGNOSIS — G43.009 MIGRAINE WITHOUT AURA AND WITHOUT STATUS MIGRAINOSUS, NOT INTRACTABLE: ICD-10-CM

## 2019-01-08 RX ORDER — LANOLIN ALCOHOL/MO/W.PET/CERES
400 CREAM (GRAM) TOPICAL DAILY
COMMUNITY

## 2019-01-08 RX ORDER — LANOLIN ALCOHOL/MO/W.PET/CERES
10 CREAM (GRAM) TOPICAL
COMMUNITY
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
## 2019-01-08 NOTE — PROGRESS NOTES
Pt here for f/u on his headaches. Over all doing well. Very few headaches. Today's is due to stress. Depression screen completed.

## 2019-06-13 ENCOUNTER — TELEPHONE (OUTPATIENT)
Dept: NEUROLOGY | Age: 29
End: 2019-06-13

## 2019-06-13 NOTE — TELEPHONE ENCOUNTER
[employer] information for PA per patient --     member ID: [id number]   relationship #: [id number]   person #: [id number]   group #:  Fabiola Logan # [id number] pcn # [id number]

## 2019-06-13 NOTE — TELEPHONE ENCOUNTER
T/C contact [facility] spoke w/ Mike Damian regarding PA status for Trokendi XR 100mg #90/90 days   Per Denver Paulson patients plan has termed . I informed Denver Paulson that I did an electronic eligibility check and patient was found eligible but the plan has been changed from POS to a HMO plan . Per Denver Paulson after investigating the 2nd verification system patient coverage termed .   I acknowledge understanding   Per Denver Paulson a request for investigation has been forward to [facility]  team to investigate this matter to  update patients profile     PA status closed until update insurance matter has been resolved     Clinical Staff informed

## 2019-06-13 NOTE — TELEPHONE ENCOUNTER
T/C Contact  Rudy Beck . Spoke w/ patient informed of insurance rejection for his coverage in order to obtain a  PA request regarding Trokendi XR 100mg . I Request that  patient to contact [facility] regarding their information of a term plan . I also informed patient that per our electronic eligibility check his [employer] plan has changed  From a POS to An HMO and that he may have been sent a new card with a slight change on his card .   Mr. Mason Thurston acknowledge understanding regarding this matter and acknowledge thathe will contact [facility] to resolve this matter     PA status closed until update insurance matter has been resolved     Clinical Staff informed

## 2019-06-17 NOTE — TELEPHONE ENCOUNTER
I spoke with pt and he verified this insurance info is correct. He states he contacted [employer] and they were able to find him in the system. Please try PA again.

## 2019-06-17 NOTE — TELEPHONE ENCOUNTER
PA request hs been submitted and researched using the information you have provided. Unfortunately, neither [employer] via [employer] or [employer] rep can find an active plan for member . Called pt and lm for him to call me back.

## 2019-06-19 ENCOUNTER — TELEPHONE (OUTPATIENT)
Dept: NEUROLOGY | Age: 29
End: 2019-06-19

## 2019-06-19 NOTE — TELEPHONE ENCOUNTER
T/C contact Mr. Ana Paula Humphrey . Updated him with my conversation with Pricilla Mitchell with [facility] regarding Rx Benefits   Explained to Mr. Ana Paula Humphrey as it was explained to me By Pricilla Mitchell that although his [facility] medial plan is still active his Employer may have changed the provider for the  Pharmacy Plan . I advised Mr. Ana Paula Humphrey to contact his HR dept to inquire of such change and if applicable to obtain the Pharmacy benefits information and request a card to prevent any future issues.       Ana Paula Humphrey acknowledge understanding with agreement that he will contact me with update     Clinical staff informed  of this matter

## 2021-04-21 ENCOUNTER — OFFICE VISIT (OUTPATIENT)
Dept: NEUROLOGY | Age: 31
End: 2021-04-21

## 2021-04-21 VITALS
BODY MASS INDEX: 26.51 KG/M2 | HEART RATE: 88 BPM | SYSTOLIC BLOOD PRESSURE: 118 MMHG | OXYGEN SATURATION: 95 % | DIASTOLIC BLOOD PRESSURE: 86 MMHG | WEIGHT: 200 LBS | RESPIRATION RATE: 16 BRPM | HEIGHT: 73 IN

## 2021-04-21 DIAGNOSIS — G43.009 MIGRAINE WITHOUT AURA AND WITHOUT STATUS MIGRAINOSUS, NOT INTRACTABLE: ICD-10-CM

## 2021-04-21 PROCEDURE — 99214 OFFICE O/P EST MOD 30 MIN: CPT | Performed by: NURSE PRACTITIONER

## 2021-04-21 RX ORDER — DEXAMETHASONE 2 MG/1
TABLET ORAL
Qty: 20 TAB | Refills: 0 | Status: SHIPPED | OUTPATIENT
Start: 2021-04-21 | End: 2022-06-02 | Stop reason: ALTCHOICE

## 2021-04-21 NOTE — PROGRESS NOTES
patient states his migraines became worse he started to get them daily again. .  Chief Complaint   Patient presents with    Follow-up    Migraine     patient states his migraines became worse he started to get them daily again.      .  Visit Vitals  /86 (BP 1 Location: Left upper arm, BP Patient Position: Sitting)   Pulse 88   Resp 16   Ht 6' 1\" (1.854 m)   Wt 200 lb (90.7 kg)   SpO2 95%   BMI 26.39 kg/m²
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
## 2021-05-14 ENCOUNTER — TELEPHONE (OUTPATIENT)
Dept: NEUROLOGY | Age: 31
End: 2021-05-14

## 2021-05-21 NOTE — TELEPHONE ENCOUNTER
Re: Adonay    Submitted PA request through [facility]. Key# [id number]    [facility] approval effective 04/21/2021-05/21/2022.     Faxed update to Kalee Hollis [address], [address] [place] [address]  [address]   [address] [place] [address]  [address], [place] [address]   Phone:  [phone]  Fax:  [phone]
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
## 2021-07-26 ENCOUNTER — OFFICE VISIT (OUTPATIENT)
Dept: NEUROLOGY | Age: 31
End: 2021-07-26
Payer: COMMERCIAL

## 2021-07-26 ENCOUNTER — DOCUMENTATION ONLY (OUTPATIENT)
Dept: NEUROLOGY | Age: 31
End: 2021-07-26

## 2021-07-26 VITALS
WEIGHT: 200 LBS | HEART RATE: 52 BPM | OXYGEN SATURATION: 98 % | SYSTOLIC BLOOD PRESSURE: 100 MMHG | HEIGHT: 73 IN | DIASTOLIC BLOOD PRESSURE: 60 MMHG | RESPIRATION RATE: 16 BRPM | BODY MASS INDEX: 26.51 KG/M2

## 2021-07-26 DIAGNOSIS — G43.009 MIGRAINE WITHOUT AURA AND WITHOUT STATUS MIGRAINOSUS, NOT INTRACTABLE: Primary | ICD-10-CM

## 2021-07-26 DIAGNOSIS — R06.83 SNORING: ICD-10-CM

## 2021-07-26 PROCEDURE — 99213 OFFICE O/P EST LOW 20 MIN: CPT | Performed by: NURSE PRACTITIONER

## 2021-07-26 NOTE — PATIENT INSTRUCTIONS
[facility]   Statement to Patients  April 1, 2014      In an effort to ensure the large volume of patient prescription refills is processed in the most efficient and expeditious manner, we are asking our patients to assist us by calling your Pharmacy for all prescription refills, this will include also your  Mail Order Pharmacy. The pharmacy will contact our office electronically to continue the refill process. Please do not wait until the last minute to call your pharmacy. We need at least 48 hours (2days) to fill prescriptions. We also encourage you to call your pharmacy before going to  your prescription to make sure it is ready. With regard to controlled substance prescription refill requests (narcotic refills) that need to be picked up at our office, we ask your cooperation by providing us with at least 72 hours (3days) notice that you will need a refill. We will not refill narcotic prescription refill requests after 4:00pm on any weekday, Monday through Thursday, or after 2:00pm on Fridays, or on the weekends. We encourage everyone to explore another way of getting your prescription refill request processed using [employer], our patient web portal through our electronic medical record system. [employer] is an efficient and effective way to communicate your medication request directly to the office and  downloadable as an dante on your smart phone . [employer] also features a review functionality that allows you to view your medication list as well as leave messages for your physician. Are you ready to get connected? If so please review the attatched instructions or speak to any of our staff to get you set up right away! Thank you so much for your cooperation. Should you have any questions please contact our Practice Administrator.     The Physicians and Staff,  [facility]

## 2021-09-16 ENCOUNTER — OFFICE VISIT (OUTPATIENT)
Dept: SLEEP MEDICINE | Age: 31
End: 2021-09-16
Payer: COMMERCIAL

## 2021-09-16 ENCOUNTER — OFFICE VISIT (OUTPATIENT)
Dept: SLEEP MEDICINE | Age: 31
End: 2021-09-16

## 2021-09-16 VITALS
HEART RATE: 69 BPM | HEIGHT: 73 IN | DIASTOLIC BLOOD PRESSURE: 68 MMHG | TEMPERATURE: 98.7 F | BODY MASS INDEX: 26.29 KG/M2 | SYSTOLIC BLOOD PRESSURE: 115 MMHG | WEIGHT: 198.4 LBS | OXYGEN SATURATION: 96 %

## 2021-09-16 DIAGNOSIS — G47.33 OSA (OBSTRUCTIVE SLEEP APNEA): Primary | ICD-10-CM

## 2021-09-16 DIAGNOSIS — G47.00 INSOMNIA WITH SLEEP APNEA: Primary | ICD-10-CM

## 2021-09-16 DIAGNOSIS — G47.30 INSOMNIA WITH SLEEP APNEA: Primary | ICD-10-CM

## 2021-09-16 PROCEDURE — 99204 OFFICE O/P NEW MOD 45 MIN: CPT | Performed by: INTERNAL MEDICINE

## 2021-09-16 RX ORDER — LORATADINE 10 MG/1
10 TABLET ORAL
COMMUNITY

## 2021-09-16 NOTE — PROGRESS NOTES
S>Judson Gramajo is a 36 y.o. male seen today to receive a home sleep testing unit [id number] (HST). · Patient was educated on proper hookup and operation of the HST via detailed instruction sheet (per COVID-19 precautions)  · Belts were fitted and adjusted for the patient during this session. · Instruction forms with after hours contact and documentation were signed. O>    There were no vitals taken for this visit. A>  No diagnosis found. P>  · General information regarding operations and maintenance of the device was provided. · Follow-up appointment was made to return the [facility] 9/17/21. He will be contacted once the results have been reviewed. [phone]  · He was asked to contact our office for any problems regarding his home sleep test study.

## 2021-09-16 NOTE — PROGRESS NOTES
[address] [facility] [address] [place], [address]  Tel.  [phone]  Fax. [address] [facility] [address]  [place], [address] [facility]  Tel.  [phone]  Fax. [phone] [address] [place]  Tel.  [phone]  Fax. [phone]       Missael Cancer is a 36y.o. year old male referred by Mr. Carolina Means NP  for evaluation of a sleep disorder. ASSESSMENT/PLAN:      ICD-10-CM ICD-9-CM    1. Insomnia with sleep apnea  G47.00 780.51 SLEEP STUDY UNATTENDED, 4 CHANNEL    G47.30     2. BMI 26.0-26.9,adult  Z68.26 V85.22        Patient has a history and examination consistent with the diagnosis of sleep apnea. Follow-up and Dispositions    · Return for telephone follow-up after testing is completed. Follow-up and Disposition History          * The patient currently has a Low Risk for having sleep apnea. STOP-BANG score 3.    * Sleep testing was ordered for initial evaluation. Orders Placed This Encounter    SLEEP STUDY UNATTENDED, 4 CHANNEL     Order Specific Question:   Reason for Exam     Answer:   SURAJ       * He was provided information on sleep apnea including corresponding risk factors and the importance of proper treatment. * Treatment options were reviewed in detail. he would like to proceed with OAT/ PAP therapy. Patient will be seen in follow-up in 6-8 weeks after PAP setup to gauge treatment response and adherence to therapy. * The patient was counseled regarding proper sleep hygiene, with emphasis on ensuring sufficient total sleep time; safe driving and the benefits of exercise and weight loss. * All of his questions were addressed. 2. Recommended a dedicated weight loss program through appropriate diet and exercise regimen as significant weight reduction has been shown to reduce severity of obstructive sleep apnea. SUBJECTIVE/OBJECTIVE:    Missael Cancer is an 36 y.o. male referred for evaluation for a sleep disorder.  He complains of snoring associated with awakening in the middle of the night because of no specific reason, non restorative sleep and extreme daytime tiredness and fatigue. He is unable to be as active as he would like to be in the evenings following return from work. Symptoms began several years ago, gradually worsening since that time. He usually can fall asleep in 30-45 minutes. Family or house members note snoring. He denies of symptoms indicative of cataplexy, sleep paralysis or sleep related hallucinations. He denies of a history of unusual movements occurring during sleep. Ephraim Hatchet does wake up frequently at night. He is bothered by waking up too early and left unable to get back to sleep. He actually sleeps about 6 hours at night and wakes up about 3 times during the night. He does work shifts:  First Shift. Aliyah Rock indicates he does (patient stated yes and no) get too little sleep at night. His bedtime is 1800. He awakens at 0530. He does not take naps. He has the following observed behaviors: Loud snoring, Light snoring;  . Other remarks: The patient has not undergone diagnostic testing for the current problems. Review of Systems:  Constitutional:  No significant weight loss or weight gain  Eyes:  No blurred vision  CVS:  No significant chest pain  Pulm:  No significant shortness of breath  GI:  No significant nausea or vomiting  :  No significant nocturia  Musculoskeletal:  No significant joint pain at night  Skin:  No significant rashes  Neuro:  No significant dizziness   Psych:  No active mood issues    Sleep Review of Systems: notable for Positive difficulty falling asleep; Positive awakenings at night; Positive perceived regular dreaming; Negative nightmares; Negative  early morning headaches; Positive  memory problems; Negative  concentration issues;  Negative caffeine;  Negative alcohol;   Negative history of any automobile or occupational accidents due to daytime drowsiness. [place] Sleepiness Score: 12   and Modified F.O.S.Q. Score Total / 2: 12    Visit Vitals  /68 (BP 1 Location: Left upper arm, BP Patient Position: Sitting, BP Cuff Size: Adult)   Pulse 69   Temp 98.7 °F (37.1 °C) (Temporal)   Ht 6' 1\" (1.854 m)   Wt 198 lb 6.4 oz (90 kg)   SpO2 96%   BMI 26.18 kg/m²           General:   Alert, oriented, not in acute distress   Eyes:  Anicteric Sclerae; intact EOM's   Nose:  No obvious nasal septum deviation    Oropharynx:   Mallampati score 4, thick tongue base, uvula not seen due to low-lying soft palate, narrow tonsilo-pharyngeal pilars, tongue scalloped   Neck:   midline trachea,  no JVD   Chest/Lungs:  symmetrical lung expansion ,clear lung fields on auscultation    CVS:  Normal rate, regular rhythm    Extremities:  No obvious rashes, absent edema    Neuro:  No focal deficits; No obvious tremor    Psych:  Normal eye contact; normal  affect, normal countenance      Patient's phone number [phone] (cell)  was reviewed and confirmed for accuracy. He gives permission for messages regarding results and appointments to be left at that number. Tiffany Caruso MD, FAASM  Diplomate American Board of Sleep Medicine  Diplomate in Sleep Medicine - ABP    Electronically signed.  09/16/21

## 2021-09-16 NOTE — PATIENT INSTRUCTIONS
[address] [facility] [address] [place], [address]  Tel.  [phone]  Fax. 100 [facility] 60  [place], [address] [facility]  Tel.  [phone]  Fax. [phone] [address]  Tel.  [phone]  Fax. [phone]     Sleep Apnea: After Your Visit  Your Care Instructions  Sleep apnea occurs when you frequently stop breathing for 10 seconds or longer during sleep. It can be mild to severe, based on the number of times per hour that you stop breathing or have slowed breathing. Blocked or narrowed airways in your nose, mouth, or throat can cause sleep apnea. Your airway can become blocked when your throat muscles and tongue relax during sleep. Sleep apnea is common, occurring in 1 out of 20 individuals. Individuals having any of the following characteristics should be evaluated and treated right away due to high risk and detrimental consequences from untreated sleep apnea:  1. Obesity  2. Congestive Heart failure  3. Atrial Fibrillation  4. Uncontrolled Hypertension  5. Type II Diabetes  6. Night-time Arrhythmias  7. Stroke  8. Pulmonary Hypertension  9. High-risk Driving Populations (pilots, truck drivers, etc.)  10. Patients Considering Weight-loss Surgery    How do you know you have sleep apnea? You probably have sleep apnea if you answer 'yes' to 3 or more of the following questions:  S - Have you been told that you Snore? T - Are you often Tired during the day? O - Has anyone Observed you stop breathing while sleeping? P- Do you have (or are being treated for) high blood Pressure? B - Are you obese (Body Mass Index > 35)? A - Is your Age 48years old or older? N - Is your Neck size greater than 16 inches? G - Are you male Gender? A sleep physician can prescribe a breathing device that prevents tissues in the throat from blocking your airway.  Or your doctor may recommend using a dental device (oral breathing device) to help keep your airway open. In some cases, surgery may be needed to remove enlarged tissues in the throat. Follow-up care is a key part of your treatment and safety. Be sure to make and go to all appointments, and call your doctor if you are having problems. It's also a good idea to know your test results and keep a list of the medicines you take. How can you care for yourself at home? · Lose weight, if needed. It may reduce the number of times you stop breathing or have slowed breathing. · Go to bed at the same time every night. · Sleep on your side. It may stop mild apnea. If you tend to roll onto your back, sew a pocket in the back of your pajama top. Put a tennis ball into the pocket, and stitch the pocket shut. This will help keep you from sleeping on your back. · Avoid alcohol and medicines such as sleeping pills and sedatives before bed. · Do not smoke. Smoking can make sleep apnea worse. If you need help quitting, talk to your doctor about stop-smoking programs and medicines. These can increase your chances of quitting for good. · Prop up the head of your bed 4 to 6 inches by putting bricks under the legs of the bed. · Treat breathing problems, such as a stuffy nose, caused by a cold or allergies. · Use a continuous positive airway pressure (CPAP) breathing machine if lifestyle changes do not help your apnea and your doctor recommends it. The machine keeps your airway from closing when you sleep. · If CPAP does not help you, ask your doctor whether you should try other breathing machines. A bilevel positive airway pressure machine has two types of air pressureâone for breathing in and one for breathing out. Another device raises or lowers air pressure as needed while you breathe. · If your nose feels dry or bleeds when using one of these machines, talk with your doctor about increasing moisture in the air. A humidifier may help.   · If your nose is runny or stuffy from using a breathing machine, talk with your doctor about using decongestants or a corticosteroid nasal spray. When should you call for help? Watch closely for changes in your health, and be sure to contact your doctor if:  · You still have sleep apnea even though you have made lifestyle changes. · You are thinking of trying a device such as CPAP. · You are having problems using a CPAP or similar machine. Where can you learn more? Go to [employer]. Enter Z391 in the search box to learn more about \"Sleep Apnea: After Your Visit. \"   © 9951-4969 Healthwise, Incorporated. Care instructions adapted under license by [address] [place] [address] [employer] (which disclaims liability or warranty for this information). This care instruction is for use with your licensed healthcare professional. If you have questions about a medical condition or this instruction, always ask your healthcare professional. Jaswant Elizondo any warranty or liability for your use of this information. PROPER SLEEP HYGIENE    What to avoid  · Do not have drinks with caffeine, such as coffee or black tea, for 8 hours before bed. · Do not smoke or use other types of tobacco near bedtime. Nicotine is a stimulant and can keep you awake. · Avoid drinking alcohol late in the evening, because it can cause you to wake in the middle of the night. · Do not eat a big meal close to bedtime. If you are hungry, eat a light snack. · Do not drink a lot of water close to bedtime, because the need to urinate may wake you up during the night. · Do not read or watch TV in bed. Use the bed only for sleeping and sexual activity. What to try  · Go to bed at the same time every night, and wake up at the same time every morning. Do not take naps during the day. · Keep your bedroom quiet, dark, and cool. · Get regular exercise, but not within 3 to 4 hours of your bedtime. .  · Sleep on a comfortable pillow and mattress.   · If watching the clock makes you anxious, turn it facing away from you so you cannot see the time. · If you worry when you lie down, start a worry book. Well before bedtime, write down your worries, and then set the book and your concerns aside. · Try meditation or other relaxation techniques before you go to bed. · If you cannot fall asleep, get up and go to another room until you feel sleepy. Do something relaxing. Repeat your bedtime routine before you go to bed again. · Make your house quiet and calm about an hour before bedtime. Turn down the lights, turn off the TV, log off the computer, and turn down the volume on music. This can help you relax after a busy day. Drowsy Driving  The [address] [place] [address] Road Traffic Safety Administration cites drowsiness as a causing factor in more than 043,189 police reported crashes annually, resulting in 76,000 injuries and 1,500 deaths. Other surveys suggest 55% of people polled have driven while drowsy in the past year, 23% had fallen asleep but not crashed, 3% crashed, and 2% had and accident due to drowsy driving. Who is at risk? Young Drivers: One study of drowsy driving accidents states that 55% of the drivers were under 25 years. Of those, 75% were male. Shift Workers and Travelers: People who work overnight or travel across time zones frequently are at higher risk of experiencing Circadian Rhythm Disorders. They are trying to work and function when their body is programed to sleep. Sleep Deprived: Lack of sleep has a serious impact on your ability to pay attention or focus on a task. Consistently getting less than the average of 8 hours your body needs creates partial or cumulative sleep deprivation. Untreated Sleep Disorders: Sleep Apnea, Narcolepsy, R.L.S., and other sleep disorders (untreated) prevent a person from getting enough restful sleep. This leads to excessive daytime sleepiness and increases the risk for drowsy driving accidents by up to 7 times.   Medications / Alcohol: Even over the counter medications can cause drowsiness. Medications that impair a drivers attention should have a warning label. Alcohol naturally makes you sleepy and on its own can cause accidents. Combined with excessive drowsiness its effects are amplified. Signs of Drowsy Driving:   * You don't remember driving the last few miles   * You may drift out of your azra   * You are unable to focus and your thoughts wander   * You may yawn more often than normal   * You have difficulty keeping your eyes open / nodding off   * Missing traffic signs, speeding, or tailgating  Prevention-   Good sleep hygiene, lifestyle and behavioral choices have the most impact on drowsy driving. There is no substitute for sleep and the average person requires 8 hours nightly. If you find yourself driving drowsy, stop and sleep. Consider the sleep hygiene tips provided during your visit as well. Medication Refill Policy: Refills for all medications require 1 week advance notice. Please have your pharmacy fax a refill request. We are unable to fax, or call in \"controled substance\" medications and you will need to pick these prescriptions up from our office. [employer] Activation    Thank you for requesting access to [employer]. Please follow the instructions below to securely access and download your online medical record. [employer] allows you to send messages to your doctor, view your test results, renew your prescriptions, schedule appointments, and more. How Do I Sign Up? 1. In your internet browser, go to [email or web address]. 2. Click on the First Time User? Click Here link in the Sign In box. You will see the New Member Sign Up page. 3. Enter your [employer] Access Code exactly as it appears below. You will not need to use this code after youve completed the sign-up process. If you do not sign up before the expiration date, you must request a new code. [employer] Access Code:  Activation code not generated  Current [employer] Status: Active (This is the date your [employer] access code will )    4. Enter the last four digits of your Social Security Number (xxxx) and Date of Birth (mm/dd/yyyy) as indicated and click Submit. You will be taken to the next sign-up page. 5. Create a eBuildert ID. This will be your [employer]) login ID and cannot be changed, so think of one that is secure and easy to remember. 6. Create a [employer]) password. You can change your password at any time. 7. Enter your Password Reset Question and Answer. This can be used at a later time if you forget your password. 8. Enter your e-mail address. You will receive e-mail notification when new information is available in [address] 9. Click Sign Up. You can now view and download portions of your medical record. 10. Click the Download Summary menu link to download a portable copy of your medical information. Additional Information    If you have questions, please call [phone]. Remember, [employer] is NOT to be used for urgent needs. For medical emergencies, dial 911.

## 2021-09-21 ENCOUNTER — TELEPHONE (OUTPATIENT)
Dept: SLEEP MEDICINE | Age: 31
End: 2021-09-21

## 2021-09-21 DIAGNOSIS — G47.33 OSA (OBSTRUCTIVE SLEEP APNEA): Primary | ICD-10-CM

## 2021-09-21 NOTE — TELEPHONE ENCOUNTER
Radha Sung is to be contacted by lead sleep technologist regarding results of Sleep Testing which was indicative of an average AHI of 0 per hour with an SpO2 kleber of 91% and SpO2 of < 88% being 0 minutes. Patient should return for repeat home sleep apnea testing due to presence of significant risk factors for Obstructive Sleep Apnea - STOP- BANG 3. Encounter Diagnosis   Name Primary?     SURAJ (obstructive sleep apnea) Yes     Orders Placed This Encounter    SLEEP STUDY UNATTENDED, 4 CHANNEL     Order Specific Question:   Reason for Exam     Answer:   SURAJ

## 2021-09-22 ENCOUNTER — DOCUMENTATION ONLY (OUTPATIENT)
Dept: SLEEP MEDICINE | Age: 31
End: 2021-09-22

## 2022-05-16 NOTE — TELEPHONE ENCOUNTER
Pt. Is canceling MRI's for 5/17/22   [place]  [place] dept ([place]) notified and ordering MD office was sent a message.   Thanks Stephan

## 2022-05-24 DIAGNOSIS — G43.009 MIGRAINE WITHOUT AURA AND WITHOUT STATUS MIGRAINOSUS, NOT INTRACTABLE: ICD-10-CM

## 2022-05-24 RX ORDER — TOPIRAMATE 100 MG/1
100 CAPSULE, EXTENDED RELEASE ORAL DAILY
Qty: 90 CAPSULE | Refills: 0 | Status: SHIPPED | OUTPATIENT
Start: 2022-05-24 | End: 2022-06-02 | Stop reason: SDUPTHER

## 2022-05-24 NOTE — TELEPHONE ENCOUNTER
Patient having difficulty refilling his prescription (Trokendi   mg capsule) He said the pharmacy said they are having issues with this prescription also. Please call. Requested Prescriptions     Pending Prescriptions Disp Refills    topiramate ER (Trokendi XR) 100 mg capsule 90 Capsule 3     Sig: Take 1 Capsule by mouth daily. Patient was also told Dr. Steph Piña not in the office this week.

## 2022-06-02 ENCOUNTER — VIRTUAL VISIT (OUTPATIENT)
Dept: NEUROLOGY | Age: 32
End: 2022-06-02
Payer: COMMERCIAL

## 2022-06-02 DIAGNOSIS — G47.9 SLEEP DISTURBANCE: ICD-10-CM

## 2022-06-02 DIAGNOSIS — G43.009 MIGRAINE WITHOUT AURA AND WITHOUT STATUS MIGRAINOSUS, NOT INTRACTABLE: Primary | ICD-10-CM

## 2022-06-02 PROCEDURE — 99213 OFFICE O/P EST LOW 20 MIN: CPT | Performed by: NURSE PRACTITIONER

## 2022-06-02 RX ORDER — RIZATRIPTAN BENZOATE 10 MG/1
TABLET, ORALLY DISINTEGRATING ORAL
Qty: 9 TABLET | Refills: 3 | Status: SHIPPED | OUTPATIENT
Start: 2022-06-02

## 2022-06-02 NOTE — PROGRESS NOTES
[address] [facility],5Th Floor  [address]   [place] [address] [place] [place] [address] Drive   [phone] Office   [phone] Fax           Date:  22     Name:  Karey Corbin  :  1990  MRN:  [id number]     PCP:  Dorita Wynn MD    Joey Gutierrez is a 29 y.o. male who was seen by synchronous (real-time) audio-video technology on 2022 for Migraine    Subjective:   Continues with Trokendi XR 100mg nightly without difficulty and this has helped tremendously. Once a month he may have one migraine that is minor which he is able to work through. About once every six months, he has one that is severe. He has to go lie down in a dark room that will last for hours. These are associated with light and sound sensitivity. He does not take anything for them and has not tried anything in the past for the migraines except for Excedrin Migraine which he does not take anymore due to counseling regarding rebound headache. At his last office visit, he was sent for a sleep medicine consult due to having difficulty staying asleep. He did see Dr. Beverly Mosley and had a sleep study done. Per review of the records, a repeat test was requested but he has not followed up. Recap from [facility]:  Joey Gutierrez is a 36 y.o. male who had frequent headaches now under good control with Trokendi and magnesium. No headaches unless he forgets a dose. He takes  melatonin for sleep which helps, but he still wakes up once a night which he is ok with. Discussed obstructive sleep apnea and long-term health consequences and recommended a sleep eval to r/o. He agreed. No changes to his medications.       Continue Trokendi 100 mg daily & Mg  Referral to Beverly Mosley MD for sleep study to r/o SURAJ  Follow up in 1 year, sooner if needed.    Instructed to call with questions or concerns.     Current Outpatient Medications   Medication Sig    topiramate ER (Trokendi XR) 100 mg capsule Take 1 Capsule by mouth daily.  loratadine (Claritin) 10 mg tablet Take 10 mg by mouth.  magnesium oxide (MAG-OX) 400 mg tablet Take 400 mg by mouth daily.  melatonin 3 mg tablet Take 10 mg by mouth. No current facility-administered medications for this visit. Allergies   Allergen Reactions    Banana Itching      Past Medical History:   Diagnosis Date    Headache      Past Surgical History:   Procedure Laterality Date    HX HEENT      wisdom teeth removed    HX TONSILLECTOMY        reports that he has never smoked. He has never used smokeless tobacco. He reports current alcohol use of about 1.0 standard drink of alcohol per week. He reports that he does not use drugs. family history includes Cancer in his father; Diabetes in his maternal grandmother; No Known Problems in his mother. ROS    Objective:   No flowsheet data found. General:  Well defined, nourished, and groomed individual in no acute distress. Psych:  Good mood and bright affect    NEUROLOGICAL EXAMINATION:     Mental Status:   Alert and oriented to person, place, and time with recent and remote memory intact. Attention span and concentration are normal. Speech is fluent with a full fund of knowledge. Cranial Nerves:  I: smell Not tested   II: visual fields Not assessed   II: pupils Equal, round, reactive to light   II: optic disc Not assessed   III,VII: ptosis none   III,IV,VI: extraocular muscles  Full ROM   V: mastication normal   V: facial light touch sensation  Not assessed   VII: facial muscle function   symmetric   VIII: hearing symmetric   IX: soft palate elevation  normal   XI: trapezius strength  Not assessed   XI: sternocleidomastoid strength Not assessed   XI: neck flexion strength  Not assessed   XII: tongue  midline     Motor Examination: Normal tone and bulk.   Strength was not assessed      Sensory exam:  Not assessed     Coordination:  Heel-to-shin was smooth and symmetrical bilaterally. Finger to nose and rapid arm movement testing was normal.   No resting or intention tremor    Gait and Station:  Steady while walking on toes, heels, and with tandem walking. Normal arm swing. No pronator drift. No muscle wasting or fasiculations noted. Reflexes:  Not assessed    Assessment & Plan:       ICD-10-CM ICD-9-CM    1. Migraine without aura and without status migrainosus, not intractable  G43.009 346.10 rizatriptan (MAXALT-MLT) 10 mg disintegrating tablet      topiramate ER (Trokendi XR) 100 mg capsule   2. Sleep disturbance  G47.9 780.50      Migraine headaches which have been significantly more manageable since being on Trokendi  mg nightly. No signs of side effect or toxicity. We will continue with Trokendi  mg nightly for migraine prevention. At this point, he does not have an effective acute abortive therapy. He was placed on a trial of Maxalt MLT 10 mg to be taken as needed at the onset of migraine. Purpose of potential side effects were discussed and he has verbalized understanding. As long as he continues to do well, he can be seen back in 6 months or sooner if needed. We discussed the expected course, resolution and complications of the diagnosis(es) in detail. Medication risks, benefits, costs, interactions, and alternatives were discussed as indicated. I advised him to contact the office if his condition worsens, changes or fails to improve as anticipated. He expressed understanding with the diagnosis(es) and plan. Reema Turcios, was evaluated through a synchronous (real-time) audio-video encounter. The patient (or guardian if applicable) is aware that this is a billable service, which includes applicable co-pays. Verbal consent to proceed has been obtained.  The visit was conducted pursuant to the emergency declaration under the [address], 1135 waiver authority and the [place] [employer] and Response Supplemental Appropriations Act. Patient identification was verified, and a caregiver was present when appropriate. The patient was located at home in a state where the provider was licensed to provide care.       Conor Hernández NP

## 2023-01-04 DIAGNOSIS — G43.009 MIGRAINE WITHOUT AURA AND WITHOUT STATUS MIGRAINOSUS, NOT INTRACTABLE: ICD-10-CM

## 2023-01-09 RX ORDER — TOPIRAMATE 100 MG/1
CAPSULE, EXTENDED RELEASE ORAL
Qty: 90 EACH | Refills: 3 | Status: SHIPPED | OUTPATIENT
Start: 2023-01-09

## 2023-01-31 ENCOUNTER — VIRTUAL VISIT (OUTPATIENT)
Dept: NEUROLOGY | Age: 33
End: 2023-01-31
Payer: COMMERCIAL

## 2023-01-31 DIAGNOSIS — G43.009 MIGRAINE WITHOUT AURA AND WITHOUT STATUS MIGRAINOSUS, NOT INTRACTABLE: Primary | ICD-10-CM

## 2023-01-31 PROCEDURE — 99213 OFFICE O/P EST LOW 20 MIN: CPT | Performed by: NURSE PRACTITIONER

## 2023-01-31 NOTE — PROGRESS NOTES
[address] [facility],5Th Floor  [address]   [address] [place] [address] [facility]   [phone] Office   [phone] Fax           Date:  23     Name:  Marcela Alexandre  :  1990  MRN:  [id number]     PCP:  Lorenz Bloch, MD    Marianna Chanel is a 29 y.o. male who was seen by synchronous (real-time) audio-video technology on 2023 for Migraine    Subjective:   Continues with Trokendi XR 100mg nightly without difficulty and this has helped tremendously. He has had one or two that put him down since his last office visit which was in 2022. This was severe and associated with severe light and sound sensitivity, nausea. He did try the Maxalt but this really did not help. He has not had any since really. Recap from [facility]:  Migraine headaches which have been significantly more manageable since being on Trokendi  mg nightly. No signs of side effect or toxicity. We will continue with Trokendi  mg nightly for migraine prevention. At this point, he does not have an effective acute abortive therapy. He was placed on a trial of Maxalt MLT 10 mg to be taken as needed at the onset of migraine. Purpose of potential side effects were discussed and he has verbalized understanding. As long as he continues to do well, he can be seen back in 6 months or sooner if needed. Current Outpatient Medications   Medication Sig    topiramate 100 mg CSpX TAKE ONE CAPSULE BY MOUTH DAILY    rizatriptan (MAXALT-MLT) 10 mg disintegrating tablet Take one at onset of migraine. May repeat    loratadine (CLARITIN) 10 mg tablet Take 10 mg by mouth.    magnesium oxide (MAG-OX) 400 mg tablet Take 400 mg by mouth daily. melatonin 3 mg tablet Take 10 mg by mouth. No current facility-administered medications for this visit.      Allergies   Allergen Reactions    Banana Itching      Past Medical History:   Diagnosis Date Headache      Past Surgical History:   Procedure Laterality Date    HX HEENT      wisdom teeth removed    HX TONSILLECTOMY        reports that he has never smoked. He has never used smokeless tobacco. He reports current alcohol use of about 1.0 standard drink per week. He reports that he does not use drugs. family history includes Cancer in his father; Diabetes in his maternal grandmother; No Known Problems in his mother. ROS    Objective:   No flowsheet data found. General:  Well defined, nourished, and groomed individual in no acute distress. Psych:  Good mood and bright affect    NEUROLOGICAL EXAMINATION:     Mental Status:   Alert and oriented to person, place, and time with recent and remote memory intact. Attention span and concentration are normal. Speech is fluent with a full fund of knowledge. Cranial Nerves:  I: smell Not tested   II: visual fields Not assessed   II: pupils Equal, round, reactive to light   II: optic disc Not assessed   III,VII: ptosis none   III,IV,VI: extraocular muscles  Full ROM   V: mastication normal   V: facial light touch sensation  Not assessed   VII: facial muscle function   symmetric   VIII: hearing symmetric   IX: soft palate elevation  normal   XI: trapezius strength  Not assessed   XI: sternocleidomastoid strength Not assessed   XI: neck flexion strength  Not assessed   XII: tongue  midline     Motor Examination: Normal tone and bulk. Strength was not assessed      Sensory exam:  Not assessed     Coordination:  No resting or intention tremor    Gait and Station:  No muscle wasting or fasiculations noted. Reflexes:  Not assessed    Assessment & Plan:       ICD-10-CM ICD-9-CM    1. Migraine without aura and without status migrainosus, not intractable  G43.009 346.10         Continues to do well on Trokendi XR for migraine prevention. He will continue with 100mg nightly as previously prescribed.  Discussed using the Maxalt MLT at the earliest indication of migraine for best results. As long as he continues to do well, he can be seen back in 6 months or sooner if needed. We discussed the expected course, resolution and complications of the diagnosis(es) in detail. Medication risks, benefits, costs, interactions, and alternatives were discussed as indicated. I advised him to contact the office if his condition worsens, changes or fails to improve as anticipated. He expressed understanding with the diagnosis(es) and plan. Misael Cherry, was evaluated through a synchronous (real-time) audio-video encounter. The patient (or guardian if applicable) is aware that this is a billable service, which includes applicable co-pays. This Virtual Visit was conducted with patient's (and/or legal guardian's) consent. The visit was conducted pursuant to the emergency declaration under the [address] [place] [address], [address] [place] [address] authority and the [employer] General Act. Patient identification was verified, and a caregiver was present when appropriate. The patient was located at: Home: [place] [address] [address] [address]  The provider was located at:  Other: Home CRISTINA Garcia NP

## 2023-07-31 ENCOUNTER — TELEMEDICINE (OUTPATIENT)
Age: 33
End: 2023-07-31
Payer: COMMERCIAL

## 2023-07-31 DIAGNOSIS — G43.009 MIGRAINE WITHOUT AURA, NOT INTRACTABLE, WITHOUT STATUS MIGRAINOSUS: Primary | ICD-10-CM

## 2023-07-31 PROCEDURE — 99213 OFFICE O/P EST LOW 20 MIN: CPT | Performed by: NURSE PRACTITIONER

## 2023-07-31 RX ORDER — CHLORAL HYDRATE 500 MG
CAPSULE ORAL DAILY
COMMUNITY

## 2023-07-31 RX ORDER — TOPIRAMATE 100 MG/1
1 CAPSULE, EXTENDED RELEASE ORAL DAILY
Qty: 30 EACH | Refills: 11 | Status: SHIPPED | OUTPATIENT
Start: 2023-07-31

## 2023-07-31 RX ORDER — RIZATRIPTAN BENZOATE 10 MG/1
TABLET, ORALLY DISINTEGRATING ORAL
Qty: 9 TABLET | Refills: 5 | Status: SHIPPED | OUTPATIENT
Start: 2023-07-31

## 2023-07-31 NOTE — PROGRESS NOTES
[address] [place] [address]  [id number] [place] [address] [address] [phone] Office   [phone] Fax           Date:  23     Name:  Bartolo Wall  :  1990  MRN:  [id number]     PCP:  Flaco Simental MD    Bartolo Wall is a 34 y.o. male who was seen by synchronous (real-time) audio-video technology on 2023 for Migraine    Subjective: At this point, he might have one headache a week or every other week which is not severe. He has not needed the Maxalt at all. He is not having any side effects from Trokendi XR. Recap from [facility]:  Continues to do well on Trokendi XR for migraine prevention. He will continue with 100mg nightly as previously prescribed. Discussed using the Maxalt MLT at the earliest indication of migraine for best results. As long as he continues to do well, he can be seen back in 6 months or sooner if needed. Current Outpatient Medications   Medication Sig    Omega-3 Fatty Acids (FISH OIL) 1000 MG capsule Take by mouth daily    loratadine (CLARITIN) 10 MG tablet Take 1 tablet by mouth    magnesium oxide (MAG-OX) 400 MG tablet Take 1 tablet by mouth daily    melatonin 3 MG TABS tablet Take 10 mg by mouth    rizatriptan (MAXALT-MLT) 10 MG disintegrating tablet Take one at onset of migraine. May repeat    Topiramate  MG CS24 TAKE ONE CAPSULE BY MOUTH DAILY     No current facility-administered medications for this visit. Allergies   Allergen Reactions    Banana Itching      Past Medical History:   Diagnosis Date    Headache      Past Surgical History:   Procedure Laterality Date    HEENT      wisdom teeth removed    TONSILLECTOMY          reports that he has never smoked. He has never used smokeless tobacco. He reports current alcohol use of about 1.0 standard drink per week. He reports that he does not use drugs.   family history includes Cancer in his father; Diabetes

## 2024-07-31 ENCOUNTER — TELEMEDICINE (OUTPATIENT)
Age: 34
End: 2024-07-31
Payer: COMMERCIAL

## 2024-07-31 DIAGNOSIS — G43.009 MIGRAINE WITHOUT AURA, NOT INTRACTABLE, WITHOUT STATUS MIGRAINOSUS: Primary | ICD-10-CM

## 2024-07-31 DIAGNOSIS — G47.26 SHIFT WORK SLEEP DISORDER: ICD-10-CM

## 2024-07-31 PROCEDURE — 99214 OFFICE O/P EST MOD 30 MIN: CPT | Performed by: NURSE PRACTITIONER

## 2024-07-31 RX ORDER — TOPIRAMATE 100 MG/1
1 CAPSULE, EXTENDED RELEASE ORAL DAILY
Qty: 30 EACH | Refills: 11 | Status: SHIPPED | OUTPATIENT
Start: 2024-07-31

## 2024-07-31 RX ORDER — RIZATRIPTAN BENZOATE 10 MG/1
TABLET, ORALLY DISINTEGRATING ORAL
Qty: 9 TABLET | Refills: 5 | Status: SHIPPED | OUTPATIENT
Start: 2024-07-31

## 2024-07-31 NOTE — PROGRESS NOTES
Headache      Past Surgical History:   Procedure Laterality Date    HEENT      wisdom teeth removed    TONSILLECTOMY          reports that he has never smoked. He has never used smokeless tobacco. He reports current alcohol use of about 1.0 standard drink of alcohol per week. He reports that he does not use drugs.  family history includes Cancer in his father; Diabetes in his maternal grandmother; No Known Problems in his mother.     Review of Systems      Objective:         7/28/2023    11:32 AM   Patient-Reported Vitals   Patient-Reported Weight 190   Patient-Reported Height 6’1”   Patient-Reported Systolic 104 mmHg   Patient-Reported Diastolic 68 mmHg   Patient-Reported Pulse 66   Patient-Reported Temperature 97.9   Patient-Reported SpO2 97%           General:  Well defined, nourished, and groomed individual in no acute distress.    Psych:  Good mood and bright affect    NEUROLOGICAL EXAMINATION:     Mental Status:   Alert and oriented to person, place, and time with recent and remote memory intact.  Attention span and concentration are normal. Speech is fluent with a full fund of knowledge.      Cranial Nerves:  I: smell Not tested   II: visual fields Not assessed   II: pupils Equal, round, reactive to light   II: optic disc Not assessed   III,VII: ptosis none   III,IV,VI: extraocular muscles  Full ROM   V: mastication normal   V: facial light touch sensation  Not assessed   VII: facial muscle function   symmetric   VIII: hearing symmetric   IX: soft palate elevation  normal   XI: trapezius strength  Not assessed   XI: sternocleidomastoid strength Not assessed   XI: neck flexion strength  Not assessed   XII: tongue  midline     Motor Examination: Normal tone and bulk.  Strength was not assessed      Sensory exam:  Not assessed     Coordination:  No resting or intention tremor    Gait and Station:  No muscle wasting or fasiculations noted.      Reflexes:  Not assessed    Assessment & Plan:      Diagnosis

## 2024-08-01 ENCOUNTER — TELEPHONE (OUTPATIENT)
Age: 34
End: 2024-08-01

## 2024-08-01 NOTE — TELEPHONE ENCOUNTER
Maxalt approval     [place] Case: [id number], Status: Approved, Coverage Starts on: 8/1/2024 12:00:00 AM, Coverage Ends on: 8/1/2025     Rt Fax to [facility]  [phone]

## 2024-08-15 ENCOUNTER — HOSPITAL ENCOUNTER (EMERGENCY)
Facility: HOSPITAL | Age: 34
Discharge: HOME | End: 2024-08-15
Attending: EMERGENCY MEDICINE
Payer: COMMERCIAL

## 2024-08-15 ENCOUNTER — APPOINTMENT (OUTPATIENT)
Facility: HOSPITAL | Age: 34
End: 2024-08-15
Payer: COMMERCIAL

## 2024-08-15 VITALS
WEIGHT: 195 LBS | SYSTOLIC BLOOD PRESSURE: 136 MMHG | HEIGHT: 71 IN | OXYGEN SATURATION: 96 % | BODY MASS INDEX: 27.3 KG/M2 | DIASTOLIC BLOOD PRESSURE: 89 MMHG | HEART RATE: 67 BPM | RESPIRATION RATE: 18 BRPM | TEMPERATURE: 97.6 F

## 2024-08-15 DIAGNOSIS — S09.90XA INJURY OF HEAD, INITIAL ENCOUNTER: Primary | ICD-10-CM

## 2024-08-15 LAB
ALBUMIN SERPL-MCNC: 4.2 G/DL (ref 3.5–5)
ALBUMIN/GLOB SERPL: 1.2 (ref 1.1–2.2)
ALP SERPL-CCNC: 79 U/L (ref 45–117)
ALT SERPL-CCNC: 60 U/L (ref 12–78)
ANION GAP SERPL CALC-SCNC: 3 MMOL/L (ref 5–15)
AST SERPL-CCNC: 34 U/L (ref 15–37)
BASOPHILS # BLD: 0 K/UL (ref 0–0.1)
BASOPHILS NFR BLD: 0 % (ref 0–1)
BILIRUB SERPL-MCNC: 0.6 MG/DL (ref 0.2–1)
BUN SERPL-MCNC: 20 MG/DL (ref 6–20)
BUN/CREAT SERPL: 16 (ref 12–20)
CALCIUM SERPL-MCNC: 9.6 MG/DL (ref 8.5–10.1)
CHLORIDE SERPL-SCNC: 108 MMOL/L (ref 97–108)
CO2 SERPL-SCNC: 27 MMOL/L (ref 21–32)
COMMENT:: NORMAL
CREAT SERPL-MCNC: 1.24 MG/DL (ref 0.7–1.3)
DIFFERENTIAL METHOD BLD: NORMAL
EKG ATRIAL RATE: 60 BPM
EKG DIAGNOSIS: NORMAL
EKG P AXIS: 44 DEGREES
EKG P-R INTERVAL: 154 MS
EKG Q-T INTERVAL: 414 MS
EKG QRS DURATION: 92 MS
EKG QTC CALCULATION (BAZETT): 414 MS
EKG R AXIS: 35 DEGREES
EKG T AXIS: 43 DEGREES
EKG VENTRICULAR RATE: 60 BPM
EOSINOPHIL # BLD: 0.2 K/UL (ref 0–0.4)
EOSINOPHIL NFR BLD: 3 % (ref 0–7)
ERYTHROCYTE [DISTWIDTH] IN BLOOD BY AUTOMATED COUNT: 12.7 % (ref 11.5–14.5)
GLOBULIN SER CALC-MCNC: 3.4 G/DL (ref 2–4)
GLUCOSE SERPL-MCNC: 88 MG/DL (ref 65–100)
HCT VFR BLD AUTO: 43.4 % (ref 36.6–50.3)
HGB BLD-MCNC: 15 G/DL (ref 12.1–17)
IMM GRANULOCYTES # BLD AUTO: 0 K/UL (ref 0–0.04)
IMM GRANULOCYTES NFR BLD AUTO: 0 % (ref 0–0.5)
LYMPHOCYTES # BLD: 2.5 K/UL (ref 0.8–3.5)
LYMPHOCYTES NFR BLD: 36 % (ref 12–49)
MCH RBC QN AUTO: 28.8 PG (ref 26–34)
MCHC RBC AUTO-ENTMCNC: 34.6 G/DL (ref 30–36.5)
MCV RBC AUTO: 83.3 FL (ref 80–99)
MONOCYTES # BLD: 0.5 K/UL (ref 0–1)
MONOCYTES NFR BLD: 8 % (ref 5–13)
NEUTS SEG # BLD: 3.6 K/UL (ref 1.8–8)
NEUTS SEG NFR BLD: 53 % (ref 32–75)
NRBC # BLD: 0 K/UL (ref 0–0.01)
NRBC BLD-RTO: 0 PER 100 WBC
PLATELET # BLD AUTO: 206 K/UL (ref 150–400)
PMV BLD AUTO: 11.9 FL (ref 8.9–12.9)
POTASSIUM SERPL-SCNC: 3.8 MMOL/L (ref 3.5–5.1)
PROT SERPL-MCNC: 7.6 G/DL (ref 6.4–8.2)
RBC # BLD AUTO: 5.21 M/UL (ref 4.1–5.7)
SODIUM SERPL-SCNC: 138 MMOL/L (ref 136–145)
SPECIMEN HOLD: NORMAL
WBC # BLD AUTO: 6.8 K/UL (ref 4.1–11.1)

## 2024-08-15 PROCEDURE — 2580000003 HC RX 258

## 2024-08-15 PROCEDURE — 70450 CT HEAD/BRAIN W/O DYE: CPT

## 2024-08-15 PROCEDURE — 6360000002 HC RX W HCPCS

## 2024-08-15 PROCEDURE — 93005 ELECTROCARDIOGRAM TRACING: CPT

## 2024-08-15 PROCEDURE — 96374 THER/PROPH/DIAG INJ IV PUSH: CPT

## 2024-08-15 PROCEDURE — 99285 EMERGENCY DEPT VISIT HI MDM: CPT

## 2024-08-15 PROCEDURE — 96375 TX/PRO/DX INJ NEW DRUG ADDON: CPT

## 2024-08-15 PROCEDURE — 36415 COLL VENOUS BLD VENIPUNCTURE: CPT

## 2024-08-15 PROCEDURE — 80053 COMPREHEN METABOLIC PANEL: CPT

## 2024-08-15 PROCEDURE — 85025 COMPLETE CBC W/AUTO DIFF WBC: CPT

## 2024-08-15 RX ORDER — DIPHENHYDRAMINE HYDROCHLORIDE 50 MG/ML
12.5 INJECTION INTRAMUSCULAR; INTRAVENOUS
Status: COMPLETED | OUTPATIENT
Start: 2024-08-15 | End: 2024-08-15

## 2024-08-15 RX ORDER — PROMETHAZINE HYDROCHLORIDE 25 MG/1
25 TABLET ORAL 3 TIMES DAILY PRN
Qty: 12 TABLET | Refills: 0 | Status: SHIPPED | OUTPATIENT
Start: 2024-08-15 | End: 2024-08-22

## 2024-08-15 RX ORDER — PROCHLORPERAZINE EDISYLATE 5 MG/ML
10 INJECTION INTRAMUSCULAR; INTRAVENOUS ONCE
Status: COMPLETED | OUTPATIENT
Start: 2024-08-15 | End: 2024-08-15

## 2024-08-15 RX ORDER — 0.9 % SODIUM CHLORIDE 0.9 %
1000 INTRAVENOUS SOLUTION INTRAVENOUS ONCE
Status: COMPLETED | OUTPATIENT
Start: 2024-08-15 | End: 2024-08-15

## 2024-08-15 RX ORDER — ONDANSETRON 4 MG/1
4 TABLET, ORALLY DISINTEGRATING ORAL 3 TIMES DAILY PRN
Qty: 21 TABLET | Refills: 0 | Status: SHIPPED | OUTPATIENT
Start: 2024-08-15

## 2024-08-15 RX ADMIN — PROCHLORPERAZINE EDISYLATE 10 MG: 5 INJECTION INTRAMUSCULAR; INTRAVENOUS at 16:02

## 2024-08-15 RX ADMIN — DIPHENHYDRAMINE HYDROCHLORIDE 12.5 MG: 50 INJECTION INTRAMUSCULAR; INTRAVENOUS at 16:03

## 2024-08-15 RX ADMIN — SODIUM CHLORIDE 1000 ML: 9 INJECTION, SOLUTION INTRAVENOUS at 16:01

## 2024-08-15 ASSESSMENT — PAIN - FUNCTIONAL ASSESSMENT: PAIN_FUNCTIONAL_ASSESSMENT: NONE - DENIES PAIN

## 2024-08-15 NOTE — DISCHARGE INSTRUCTIONS
Discussed visit today.  Please follow-up with your neurologist.  Discussed brain rest as well as staying hydrated at home.  Please continue to take your medication at home to treat for your migraines.    Return to the emergency room with any worsening of symptoms.
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
## 2024-08-26 ENCOUNTER — TELEPHONE (OUTPATIENT)
Age: 34
End: 2024-08-26

## 2024-08-26 NOTE — TELEPHONE ENCOUNTER
Patient was told (by his ) that his appt. (8.28.24) has been cancelled bc it's a Workman's Comp. Issue. I told him I still see it scheduled.    Will you pls call him if it has been cancelled due to being Work Comp issue?    Thank you.

## 2024-08-26 NOTE — TELEPHONE ENCOUNTER
Called patient. No answer. Left a VM stating I still have the appointment scheduled on our end but he will need to contact his  on why they are canceling it.
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
## 2024-08-28 ENCOUNTER — OFFICE VISIT (OUTPATIENT)
Age: 34
End: 2024-08-28
Payer: COMMERCIAL

## 2024-08-28 VITALS
OXYGEN SATURATION: 99 % | SYSTOLIC BLOOD PRESSURE: 118 MMHG | HEART RATE: 92 BPM | RESPIRATION RATE: 16 BRPM | DIASTOLIC BLOOD PRESSURE: 64 MMHG | HEIGHT: 71 IN | BODY MASS INDEX: 27.3 KG/M2 | WEIGHT: 195 LBS

## 2024-08-28 DIAGNOSIS — F07.81 POST CONCUSSION SYNDROME: Primary | ICD-10-CM

## 2024-08-28 DIAGNOSIS — R42 DIZZINESS: ICD-10-CM

## 2024-08-28 DIAGNOSIS — G43.009 MIGRAINE WITHOUT AURA, NOT INTRACTABLE, WITHOUT STATUS MIGRAINOSUS: ICD-10-CM

## 2024-08-28 DIAGNOSIS — Z02.89 ENCOUNTER FOR COMPLETION OF FORM WITH PATIENT: ICD-10-CM

## 2024-08-28 DIAGNOSIS — T75.3XXS MOTION SICKNESS, SEQUELA: ICD-10-CM

## 2024-08-28 PROCEDURE — 99204 OFFICE O/P NEW MOD 45 MIN: CPT

## 2024-08-28 RX ORDER — SCOLOPAMINE TRANSDERMAL SYSTEM 1 MG/1
1 PATCH, EXTENDED RELEASE TRANSDERMAL
Qty: 5 PATCH | Refills: 0 | Status: SHIPPED | OUTPATIENT
Start: 2024-08-28

## 2024-08-28 ASSESSMENT — ENCOUNTER SYMPTOMS
TROUBLE SWALLOWING: 0
VOICE CHANGE: 0

## 2024-08-28 ASSESSMENT — PATIENT HEALTH QUESTIONNAIRE - PHQ9
SUM OF ALL RESPONSES TO PHQ QUESTIONS 1-9: 0
SUM OF ALL RESPONSES TO PHQ9 QUESTIONS 1 & 2: 0
SUM OF ALL RESPONSES TO PHQ QUESTIONS 1-9: 0
1. LITTLE INTEREST OR PLEASURE IN DOING THINGS: NOT AT ALL
SUM OF ALL RESPONSES TO PHQ QUESTIONS 1-9: 0
SUM OF ALL RESPONSES TO PHQ QUESTIONS 1-9: 0
2. FEELING DOWN, DEPRESSED OR HOPELESS: NOT AT ALL

## 2024-08-28 NOTE — PATIENT INSTRUCTIONS
As a reminder:   Please come to your appointment 15 minutes before your office appointment.  This way, you can get checked in at the  and checked in by the nursing staff so you have the full allotment of time with your provider for your visit.  Please bring an up-to-date and accurate list of all your medications.  Or bring all your active prescription bottles with you at the time of your office visit and this includes over-the-counter medications so we can make sure that your medication list is up-to-date.  If you are scheduled for a virtual visit, please be aware that the  will need to check you in and usually the day before to verify insurance and collect co-pays as appropriate.  Please be prepared for the second call which will be from the nurse to go over your medications and any other vital information.  This will probably be done 30 minutes prior to your visit.  The reason why we do this early is that you can get the full benefit of your appointment time with your provider.  Finally you will be given the link for your virtual visit please click into your link 10 minutes prior to your appointment and please wait patiently for the provider to join you                      Office Policies    Phone calls/patient messages:  Please allow up to 72 hours  for someone in the office to contact you about your call or message. Be mindful your provider may be out of the office or your message may require further review. We encourage you to use [employer] for your messages as this is a faster, more efficient way to communicate with our office    Medication Refills:  Prescription medications require up to 48 business hours to process. We encourage you to use [employer] for your refills.     For controlled medications: Please allow up to 72 business hours to process. Certain medications may require you to  a written prescription at our office.    NO narcotic/controlled medications will be prescribed after  4pm Monday through Friday or on weekends    Form/Paperwork Completion:  We ask that you allow 7-14 business days.  Forms can be downloaded to [email or web address] or you can have them faxed, mailed, or you can bring them in to our office directly.

## 2024-11-05 ENCOUNTER — TELEPHONE (OUTPATIENT)
Age: 34
End: 2024-11-05

## 2024-11-05 NOTE — TELEPHONE ENCOUNTER
Calling to find out if office received referral for concussion physical therapy. States that she faxed to [facility] [facility] [place] location due unsuccessful attempts to [facility] fax. Also states they're waiting for provider to sign off document and fax back

## 2024-11-08 NOTE — TELEPHONE ENCOUNTER
Patient requesting order update for [facility], if documents been re faxed?    Please contact

## 2024-11-08 NOTE — TELEPHONE ENCOUNTER
Called patient. No answer. Left a VM stating we received the fax. Provider is out of the office but it is placed on provider's desk to sign on Monday.

## 2024-12-02 ENCOUNTER — OFFICE VISIT (OUTPATIENT)
Age: 34
End: 2024-12-02
Payer: COMMERCIAL

## 2024-12-02 VITALS
HEART RATE: 109 BPM | OXYGEN SATURATION: 97 % | BODY MASS INDEX: 28 KG/M2 | WEIGHT: 200 LBS | SYSTOLIC BLOOD PRESSURE: 124 MMHG | DIASTOLIC BLOOD PRESSURE: 62 MMHG | RESPIRATION RATE: 16 BRPM | HEIGHT: 71 IN

## 2024-12-02 DIAGNOSIS — Z02.89 ENCOUNTER FOR COMPLETION OF FORM WITH PATIENT: ICD-10-CM

## 2024-12-02 DIAGNOSIS — F07.81 POST CONCUSSION SYNDROME: Primary | ICD-10-CM

## 2024-12-02 DIAGNOSIS — G43.009 MIGRAINE WITHOUT AURA, NOT INTRACTABLE, WITHOUT STATUS MIGRAINOSUS: ICD-10-CM

## 2024-12-02 DIAGNOSIS — R42 DIZZINESS: ICD-10-CM

## 2024-12-02 DIAGNOSIS — T75.3XXS MOTION SICKNESS, SEQUELA: ICD-10-CM

## 2024-12-02 PROCEDURE — 99214 OFFICE O/P EST MOD 30 MIN: CPT

## 2024-12-02 ASSESSMENT — PATIENT HEALTH QUESTIONNAIRE - PHQ9
SUM OF ALL RESPONSES TO PHQ QUESTIONS 1-9: 0
SUM OF ALL RESPONSES TO PHQ9 QUESTIONS 1 & 2: 0
SUM OF ALL RESPONSES TO PHQ QUESTIONS 1-9: 0
2. FEELING DOWN, DEPRESSED OR HOPELESS: NOT AT ALL
SUM OF ALL RESPONSES TO PHQ QUESTIONS 1-9: 0
1. LITTLE INTEREST OR PLEASURE IN DOING THINGS: NOT AT ALL
SUM OF ALL RESPONSES TO PHQ QUESTIONS 1-9: 0

## 2024-12-02 NOTE — PROGRESS NOTES
Neurology Clinic Follow up Note    Patient ID:   Anderson Winston  1990  34 y.o. male  [id number]      Chief Complaint   Patient presents with    Follow-up     Post concussion syndrome       Last Appointment With Me:    8/28/2024  \" 34 year old male with PMHx of migraines and shift work sleep disorder comes to the clinic for hospital follow up after evaluation after head injury with ongoing fatigue, nausea, dizziness, and intermittent headaches. Initial injury occurred 8/13, with symptoms started on 8/15 prompting him to seek care in the ED. A CTH was done which was negative/normal. His exam today is nonfocal, essentially normal, and reassuring. Main concerns per patient are nausea, fatigue, and when he can return to work. For nausea, he can continue PRN use of promethazine and ondansetron, will add Scopolamine transdermal patches as well. Potential side effects were discussed. Encouraged him to stay very well hydrated to avoid side effect of constipation. Discussed he could also try OTC Benadryl as alternative.  For fatigue, recommend budgeting time for task completion with breaks and regular periods of rest, avoid overexertion. We will proceed with formal PT to help with residual dizziness. He has adequate supply of his migraine medications at home and will continue as indicated by prior Neurology NP evaluation. We will see him back in the clinic in 3 mos, sooner prn. He will continue to see AMERICA Allison-FOX at our [facility] for chronic care of migraines with annual visits.  \"    Interval History:   He states nausea is much improved with use of applied cold temperatures such as ice packs/cold water bottle on his neck.  Will relieve his nausea in less than 1 minute. He is now off of all antiemetic medication. Describes dizziness as minimal, occurs with moving up/down. Just came from PT and exercises today provoked dizziness with moving objects from a bottom shelf to a top shelf. Motion sickness is much

## (undated) DEVICE — STERILE POLYISOPRENE POWDER-FREE SURGICAL GLOVES WITH EMOLLIENT COATING: Brand: PROTEXIS

## (undated) DEVICE — (D)STRIP SKN CLSR 0.5X4IN WHT --

## (undated) DEVICE — SOLUTION IV 1000ML 0.9% SOD CHL

## (undated) DEVICE — SUTURE VCRL SZ 3-0 L27IN ABSRB UD L26MM SH 1/2 CIR J416H

## (undated) DEVICE — MASTISOL ADHESIVE LIQ 2/3ML

## (undated) DEVICE — INFECTION CONTROL KIT SYS

## (undated) DEVICE — MEDI-VAC NON-CONDUCTIVE SUCTION TUBING: Brand: CARDINAL HEALTH

## (undated) DEVICE — Device

## (undated) DEVICE — KENDALL DL ECG CABLE AND LEAD WIRE SYSTEM, 3-LEAD, SINGLE PATIENT USE: Brand: KENDALL

## (undated) DEVICE — BIPOLAR FORCEPS CORD,BANANA LEADS: Brand: VALLEYLAB

## (undated) DEVICE — SYR 10ML CTRL LR LCK NSAF LF --

## (undated) DEVICE — REM POLYHESIVE ADULT PATIENT RETURN ELECTRODE: Brand: VALLEYLAB

## (undated) DEVICE — INSULATED BLADE ELECTRODE: Brand: EDGE

## (undated) DEVICE — X-RAY SPONGES,16 PLY: Brand: DERMACEA

## (undated) DEVICE — TRAY PREP DRY W/ PREM GLV 2 APPL 6 SPNG 2 UNDPD 1 OVERWRAP

## (undated) DEVICE — SUTURE MCRYL SZ 4-0 L27IN ABSRB UD L19MM PS-2 1/2 CIR PRIM Y426H

## (undated) DEVICE — SUT ETHLN 3-0 18IN PS2 BLK --

## (undated) DEVICE — DRAPE,EENT,SPLIT,STERILE: Brand: MEDLINE

## (undated) DEVICE — SOLUTION LACTATED RINGERS INJECTION USP

## (undated) DEVICE — LIGHT HANDLE: Brand: DEVON

## (undated) DEVICE — NEEDLE HYPO 25GA L1.5IN BVL ORIENTED ECLIPSE

## (undated) DEVICE — FRAZIER SUCTION INSTRUMENT 7 FR W/CONTROL VENT & OBTURATOR: Brand: FRAZIER

## (undated) DEVICE — THIS PRODUCT IS SINGLE USE AND INTENDED TO BE USED FOR BLUNT DISSECTION OF TISSUE.: Brand: ASPEN® ENDOSCOPIC KITTNER, SINGLE TIP

## (undated) DEVICE — SUTURE PERMAHAND SZ 2-0 L18IN NONABSORBABLE BLK L26MM PS 1588H

## (undated) DEVICE — ZINACTIVE USE 2641837 CLIP LIG M BLU TI HRT SHP WIRE HORZ 600 PER BX

## (undated) DEVICE — CONTINU-FLO SOLUTION SET, 2 INJECTION SITES, MALE LUER LOCK ADAPTER WITH RETRACTABLE COLLAR, LARGE BORE STOPCOCK WITH ROTATING MALE LUER LOCK EXTENSION SET, 2 INJECTION SITES, MALE LUER LOCK ADAPTER WITH RETRACTABLE COLLAR: Brand: INTERLINK/CONTINU-FLO

## (undated) DEVICE — CLIP INT SM WIDE RED TI TRNSVRS GRV CHEVRON SHP W PRECIS

## (undated) DEVICE — 3M™ TEGADERM™ TRANSPARENT FILM DRESSING FRAME STYLE, 1624W, 2-3/8 IN X 2-3/4 IN (6 CM X 7 CM), 100/CT 4CT/CASE: Brand: 3M™ TEGADERM™

## (undated) DEVICE — SLIM BODY SKIN STAPLER: Brand: APPOSE ULC

## (undated) DEVICE — AGENT HEMSTAT 5GM ARISTA AH

## (undated) DEVICE — 1200 GUARD II KIT W/5MM TUBE W/O VAC TUBE: Brand: GUARDIAN

## (undated) DEVICE — SYR IRR BLB 2OZ DISP BLU STRL -- CONVERT TO ITEM 357637

## (undated) DEVICE — SUTURE PERMAHAND SZ 3-0 L30IN NONABSORBABLE BLK SILK BRAID A304H

## (undated) DEVICE — GAUZE SPONGES,12 PLY: Brand: CURITY

## (undated) DEVICE — DRAIN WND RND SILICONE 10FR